# Patient Record
Sex: FEMALE | Race: WHITE | NOT HISPANIC OR LATINO | URBAN - METROPOLITAN AREA
[De-identification: names, ages, dates, MRNs, and addresses within clinical notes are randomized per-mention and may not be internally consistent; named-entity substitution may affect disease eponyms.]

---

## 2018-01-23 ENCOUNTER — LAB REQUISITION (OUTPATIENT)
Dept: LAB | Facility: HOSPITAL | Age: 43
End: 2018-01-23

## 2018-01-23 DIAGNOSIS — Z00.00 ROUTINE GENERAL MEDICAL EXAMINATION AT A HEALTH CARE FACILITY: ICD-10-CM

## 2018-01-23 PROCEDURE — 36415 COLL VENOUS BLD VENIPUNCTURE: CPT

## 2018-10-04 ENCOUNTER — LAB REQUISITION (OUTPATIENT)
Dept: LAB | Facility: HOSPITAL | Age: 43
End: 2018-10-04

## 2018-10-04 DIAGNOSIS — Z00.00 ROUTINE GENERAL MEDICAL EXAMINATION AT A HEALTH CARE FACILITY: ICD-10-CM

## 2018-10-04 PROCEDURE — 36415 COLL VENOUS BLD VENIPUNCTURE: CPT

## 2023-10-04 ENCOUNTER — APPOINTMENT (OUTPATIENT)
Dept: GENERAL RADIOLOGY | Facility: HOSPITAL | Age: 48
End: 2023-10-04
Payer: COMMERCIAL

## 2023-10-04 ENCOUNTER — HOSPITAL ENCOUNTER (OUTPATIENT)
Facility: HOSPITAL | Age: 48
Setting detail: OBSERVATION
LOS: 1 days | Discharge: HOME | End: 2023-10-06
Attending: EMERGENCY MEDICINE | Admitting: HOSPITALIST
Payer: COMMERCIAL

## 2023-10-04 ENCOUNTER — APPOINTMENT (OUTPATIENT)
Dept: CARDIOLOGY | Facility: HOSPITAL | Age: 48
End: 2023-10-04
Payer: COMMERCIAL

## 2023-10-04 ENCOUNTER — APPOINTMENT (OUTPATIENT)
Dept: CT IMAGING | Facility: HOSPITAL | Age: 48
End: 2023-10-04
Payer: COMMERCIAL

## 2023-10-04 DIAGNOSIS — I37.9: Primary | ICD-10-CM

## 2023-10-04 DIAGNOSIS — R06.02 SHORTNESS OF BREATH: ICD-10-CM

## 2023-10-04 PROBLEM — E87.6 HYPOKALEMIA: Status: ACTIVE | Noted: 2023-10-04

## 2023-10-04 PROBLEM — R93.89 ABNORMAL CT SCAN, CHEST: Status: ACTIVE | Noted: 2023-10-04

## 2023-10-04 PROBLEM — J40 BRONCHITIS: Status: ACTIVE | Noted: 2023-10-04

## 2023-10-04 LAB
ALBUMIN SERPL-MCNC: 3.9 G/DL (ref 3.5–5.2)
ALBUMIN/GLOB SERPL: 1.2 G/DL
ALP SERPL-CCNC: 132 U/L (ref 39–117)
ALT SERPL W P-5'-P-CCNC: 16 U/L (ref 1–33)
ANION GAP SERPL CALCULATED.3IONS-SCNC: 12 MMOL/L (ref 5–15)
AST SERPL-CCNC: 17 U/L (ref 1–32)
B PARAPERT DNA SPEC QL NAA+PROBE: NOT DETECTED
B PERT DNA SPEC QL NAA+PROBE: NOT DETECTED
BASOPHILS # BLD AUTO: 0.01 10*3/MM3 (ref 0–0.2)
BASOPHILS NFR BLD AUTO: 0.1 % (ref 0–1.5)
BILIRUB SERPL-MCNC: 0.3 MG/DL (ref 0–1.2)
BUN SERPL-MCNC: 10 MG/DL (ref 6–20)
BUN/CREAT SERPL: 15.4 (ref 7–25)
C PNEUM DNA NPH QL NAA+NON-PROBE: NOT DETECTED
CALCIUM SPEC-SCNC: 8.9 MG/DL (ref 8.6–10.5)
CHLORIDE SERPL-SCNC: 104 MMOL/L (ref 98–107)
CO2 SERPL-SCNC: 25 MMOL/L (ref 22–29)
CREAT SERPL-MCNC: 0.65 MG/DL (ref 0.57–1)
D DIMER PPP FEU-MCNC: 2.48 MCGFEU/ML (ref 0–0.5)
D-LACTATE SERPL-SCNC: 1 MMOL/L (ref 0.5–2)
DEPRECATED RDW RBC AUTO: 44.3 FL (ref 37–54)
EGFRCR SERPLBLD CKD-EPI 2021: 110.1 ML/MIN/1.73
EOSINOPHIL # BLD AUTO: 0.32 10*3/MM3 (ref 0–0.4)
EOSINOPHIL NFR BLD AUTO: 3.6 % (ref 0.3–6.2)
ERYTHROCYTE [DISTWIDTH] IN BLOOD BY AUTOMATED COUNT: 12.9 % (ref 12.3–15.4)
FLUAV SUBTYP SPEC NAA+PROBE: NOT DETECTED
FLUBV RNA ISLT QL NAA+PROBE: NOT DETECTED
GLOBULIN UR ELPH-MCNC: 3.3 GM/DL
GLUCOSE SERPL-MCNC: 109 MG/DL (ref 65–99)
HADV DNA SPEC NAA+PROBE: NOT DETECTED
HCOV 229E RNA SPEC QL NAA+PROBE: NOT DETECTED
HCOV HKU1 RNA SPEC QL NAA+PROBE: NOT DETECTED
HCOV NL63 RNA SPEC QL NAA+PROBE: NOT DETECTED
HCOV OC43 RNA SPEC QL NAA+PROBE: NOT DETECTED
HCT VFR BLD AUTO: 41.7 % (ref 34–46.6)
HGB BLD-MCNC: 13.4 G/DL (ref 12–15.9)
HMPV RNA NPH QL NAA+NON-PROBE: NOT DETECTED
HOLD SPECIMEN: NORMAL
HPIV1 RNA ISLT QL NAA+PROBE: NOT DETECTED
HPIV2 RNA SPEC QL NAA+PROBE: NOT DETECTED
HPIV3 RNA NPH QL NAA+PROBE: NOT DETECTED
HPIV4 P GENE NPH QL NAA+PROBE: NOT DETECTED
IMM GRANULOCYTES # BLD AUTO: 0.02 10*3/MM3 (ref 0–0.05)
IMM GRANULOCYTES NFR BLD AUTO: 0.2 % (ref 0–0.5)
LYMPHOCYTES # BLD AUTO: 1.02 10*3/MM3 (ref 0.7–3.1)
LYMPHOCYTES NFR BLD AUTO: 11.5 % (ref 19.6–45.3)
M PNEUMO IGG SER IA-ACNC: NOT DETECTED
MCH RBC QN AUTO: 30.2 PG (ref 26.6–33)
MCHC RBC AUTO-ENTMCNC: 32.1 G/DL (ref 31.5–35.7)
MCV RBC AUTO: 93.9 FL (ref 79–97)
MONOCYTES # BLD AUTO: 0.39 10*3/MM3 (ref 0.1–0.9)
MONOCYTES NFR BLD AUTO: 4.4 % (ref 5–12)
NEUTROPHILS NFR BLD AUTO: 7.11 10*3/MM3 (ref 1.7–7)
NEUTROPHILS NFR BLD AUTO: 80.2 % (ref 42.7–76)
NRBC BLD AUTO-RTO: 0 /100 WBC (ref 0–0.2)
NT-PROBNP SERPL-MCNC: 188.7 PG/ML (ref 0–450)
PLATELET # BLD AUTO: 204 10*3/MM3 (ref 140–450)
PMV BLD AUTO: 10.5 FL (ref 6–12)
POTASSIUM SERPL-SCNC: 3.2 MMOL/L (ref 3.5–5.2)
PROCALCITONIN SERPL-MCNC: 0.07 NG/ML (ref 0–0.25)
PROT SERPL-MCNC: 7.2 G/DL (ref 6–8.5)
QT INTERVAL: 344 MS
QTC INTERVAL: 436 MS
RBC # BLD AUTO: 4.44 10*6/MM3 (ref 3.77–5.28)
RHINOVIRUS RNA SPEC NAA+PROBE: NOT DETECTED
RSV RNA NPH QL NAA+NON-PROBE: NOT DETECTED
SARS-COV-2 RNA NPH QL NAA+NON-PROBE: NOT DETECTED
SODIUM SERPL-SCNC: 141 MMOL/L (ref 136–145)
TROPONIN T SERPL HS-MCNC: 7 NG/L
WBC NRBC COR # BLD: 8.87 10*3/MM3 (ref 3.4–10.8)
WHOLE BLOOD HOLD COAG: NORMAL
WHOLE BLOOD HOLD SPECIMEN: NORMAL

## 2023-10-04 PROCEDURE — 94799 UNLISTED PULMONARY SVC/PX: CPT

## 2023-10-04 PROCEDURE — G0378 HOSPITAL OBSERVATION PER HR: HCPCS

## 2023-10-04 PROCEDURE — 99285 EMERGENCY DEPT VISIT HI MDM: CPT

## 2023-10-04 PROCEDURE — 94640 AIRWAY INHALATION TREATMENT: CPT

## 2023-10-04 PROCEDURE — 71275 CT ANGIOGRAPHY CHEST: CPT

## 2023-10-04 PROCEDURE — 25810000003 LACTATED RINGERS SOLUTION: Performed by: EMERGENCY MEDICINE

## 2023-10-04 PROCEDURE — 84484 ASSAY OF TROPONIN QUANT: CPT | Performed by: EMERGENCY MEDICINE

## 2023-10-04 PROCEDURE — 83605 ASSAY OF LACTIC ACID: CPT | Performed by: STUDENT IN AN ORGANIZED HEALTH CARE EDUCATION/TRAINING PROGRAM

## 2023-10-04 PROCEDURE — 85025 COMPLETE CBC W/AUTO DIFF WBC: CPT | Performed by: EMERGENCY MEDICINE

## 2023-10-04 PROCEDURE — 25010000002 KETOROLAC TROMETHAMINE PER 15 MG: Performed by: EMERGENCY MEDICINE

## 2023-10-04 PROCEDURE — 83880 ASSAY OF NATRIURETIC PEPTIDE: CPT | Performed by: EMERGENCY MEDICINE

## 2023-10-04 PROCEDURE — 93306 TTE W/DOPPLER COMPLETE: CPT

## 2023-10-04 PROCEDURE — 96374 THER/PROPH/DIAG INJ IV PUSH: CPT

## 2023-10-04 PROCEDURE — 87040 BLOOD CULTURE FOR BACTERIA: CPT | Performed by: STUDENT IN AN ORGANIZED HEALTH CARE EDUCATION/TRAINING PROGRAM

## 2023-10-04 PROCEDURE — 25510000001 IOPAMIDOL PER 1 ML: Performed by: EMERGENCY MEDICINE

## 2023-10-04 PROCEDURE — 36415 COLL VENOUS BLD VENIPUNCTURE: CPT

## 2023-10-04 PROCEDURE — 84145 PROCALCITONIN (PCT): CPT | Performed by: INTERNAL MEDICINE

## 2023-10-04 PROCEDURE — 80053 COMPREHEN METABOLIC PANEL: CPT | Performed by: EMERGENCY MEDICINE

## 2023-10-04 PROCEDURE — 96372 THER/PROPH/DIAG INJ SC/IM: CPT

## 2023-10-04 PROCEDURE — 25010000002 ENOXAPARIN PER 10 MG: Performed by: INTERNAL MEDICINE

## 2023-10-04 PROCEDURE — 85379 FIBRIN DEGRADATION QUANT: CPT | Performed by: EMERGENCY MEDICINE

## 2023-10-04 PROCEDURE — 93306 TTE W/DOPPLER COMPLETE: CPT | Performed by: INTERNAL MEDICINE

## 2023-10-04 PROCEDURE — 0202U NFCT DS 22 TRGT SARS-COV-2: CPT | Performed by: INTERNAL MEDICINE

## 2023-10-04 PROCEDURE — 71045 X-RAY EXAM CHEST 1 VIEW: CPT

## 2023-10-04 PROCEDURE — 93005 ELECTROCARDIOGRAM TRACING: CPT | Performed by: EMERGENCY MEDICINE

## 2023-10-04 PROCEDURE — 94761 N-INVAS EAR/PLS OXIMETRY MLT: CPT

## 2023-10-04 RX ORDER — POTASSIUM CHLORIDE 750 MG/1
40 CAPSULE, EXTENDED RELEASE ORAL EVERY 4 HOURS
Status: COMPLETED | OUTPATIENT
Start: 2023-10-04 | End: 2023-10-04

## 2023-10-04 RX ORDER — HYDROXYZINE HYDROCHLORIDE 10 MG/1
10 TABLET, FILM COATED ORAL 4 TIMES DAILY PRN
Status: DISCONTINUED | OUTPATIENT
Start: 2023-10-04 | End: 2023-10-06 | Stop reason: HOSPADM

## 2023-10-04 RX ORDER — IPRATROPIUM BROMIDE AND ALBUTEROL SULFATE 2.5; .5 MG/3ML; MG/3ML
3 SOLUTION RESPIRATORY (INHALATION) ONCE
Status: COMPLETED | OUTPATIENT
Start: 2023-10-04 | End: 2023-10-04

## 2023-10-04 RX ORDER — IBUPROFEN 200 MG
200 TABLET ORAL EVERY 6 HOURS PRN
COMMUNITY
End: 2023-10-06 | Stop reason: HOSPADM

## 2023-10-04 RX ORDER — MINOCYCLINE HYDROCHLORIDE 50 MG/1
75 CAPSULE ORAL DAILY
COMMUNITY
End: 2023-10-06 | Stop reason: HOSPADM

## 2023-10-04 RX ORDER — SODIUM CHLORIDE 0.9 % (FLUSH) 0.9 %
10 SYRINGE (ML) INJECTION AS NEEDED
Status: DISCONTINUED | OUTPATIENT
Start: 2023-10-04 | End: 2023-10-06 | Stop reason: HOSPADM

## 2023-10-04 RX ORDER — BISACODYL 10 MG
10 SUPPOSITORY, RECTAL RECTAL DAILY PRN
Status: DISCONTINUED | OUTPATIENT
Start: 2023-10-04 | End: 2023-10-06 | Stop reason: HOSPADM

## 2023-10-04 RX ORDER — GUAIFENESIN AND DEXTROMETHORPHAN HYDROBROMIDE 600; 30 MG/1; MG/1
1 TABLET, EXTENDED RELEASE ORAL 2 TIMES DAILY PRN
Status: DISCONTINUED | OUTPATIENT
Start: 2023-10-04 | End: 2023-10-06 | Stop reason: HOSPADM

## 2023-10-04 RX ORDER — LORATADINE 10 MG/1
CAPSULE, LIQUID FILLED ORAL
COMMUNITY

## 2023-10-04 RX ORDER — ACETAMINOPHEN 325 MG/1
650 TABLET ORAL EVERY 4 HOURS PRN
Status: DISCONTINUED | OUTPATIENT
Start: 2023-10-04 | End: 2023-10-06 | Stop reason: HOSPADM

## 2023-10-04 RX ORDER — AMOXICILLIN 250 MG
2 CAPSULE ORAL 2 TIMES DAILY
Status: DISCONTINUED | OUTPATIENT
Start: 2023-10-04 | End: 2023-10-06 | Stop reason: HOSPADM

## 2023-10-04 RX ORDER — DOXYCYCLINE 100 MG/1
100 CAPSULE ORAL EVERY 12 HOURS SCHEDULED
Status: DISCONTINUED | OUTPATIENT
Start: 2023-10-04 | End: 2023-10-05

## 2023-10-04 RX ORDER — THIAMINE HCL 50 MG
50 TABLET ORAL DAILY
COMMUNITY

## 2023-10-04 RX ORDER — SODIUM CHLORIDE 9 MG/ML
40 INJECTION, SOLUTION INTRAVENOUS AS NEEDED
Status: DISCONTINUED | OUTPATIENT
Start: 2023-10-04 | End: 2023-10-06 | Stop reason: HOSPADM

## 2023-10-04 RX ORDER — ENOXAPARIN SODIUM 100 MG/ML
40 INJECTION SUBCUTANEOUS DAILY
Status: DISCONTINUED | OUTPATIENT
Start: 2023-10-04 | End: 2023-10-06

## 2023-10-04 RX ORDER — MULTIVIT WITH MINERALS/LUTEIN
1000 TABLET ORAL DAILY
COMMUNITY

## 2023-10-04 RX ORDER — SODIUM CHLORIDE 0.9 % (FLUSH) 0.9 %
10 SYRINGE (ML) INJECTION EVERY 12 HOURS SCHEDULED
Status: DISCONTINUED | OUTPATIENT
Start: 2023-10-04 | End: 2023-10-06 | Stop reason: HOSPADM

## 2023-10-04 RX ORDER — IPRATROPIUM BROMIDE AND ALBUTEROL SULFATE 2.5; .5 MG/3ML; MG/3ML
3 SOLUTION RESPIRATORY (INHALATION)
Status: DISCONTINUED | OUTPATIENT
Start: 2023-10-04 | End: 2023-10-06 | Stop reason: HOSPADM

## 2023-10-04 RX ORDER — MELATONIN
1000 DAILY
COMMUNITY

## 2023-10-04 RX ORDER — LOSARTAN POTASSIUM 50 MG/1
100 TABLET ORAL
Status: DISCONTINUED | OUTPATIENT
Start: 2023-10-04 | End: 2023-10-06 | Stop reason: HOSPADM

## 2023-10-04 RX ORDER — BISACODYL 5 MG/1
5 TABLET, DELAYED RELEASE ORAL DAILY PRN
Status: DISCONTINUED | OUTPATIENT
Start: 2023-10-04 | End: 2023-10-06 | Stop reason: HOSPADM

## 2023-10-04 RX ORDER — FLUTICASONE PROPIONATE 50 MCG
2 SPRAY, SUSPENSION (ML) NASAL DAILY
Status: DISCONTINUED | OUTPATIENT
Start: 2023-10-04 | End: 2023-10-06 | Stop reason: HOSPADM

## 2023-10-04 RX ORDER — POLYETHYLENE GLYCOL 3350 17 G/17G
17 POWDER, FOR SOLUTION ORAL DAILY PRN
Status: DISCONTINUED | OUTPATIENT
Start: 2023-10-04 | End: 2023-10-06 | Stop reason: HOSPADM

## 2023-10-04 RX ORDER — KETOROLAC TROMETHAMINE 15 MG/ML
15 INJECTION, SOLUTION INTRAMUSCULAR; INTRAVENOUS ONCE
Status: COMPLETED | OUTPATIENT
Start: 2023-10-04 | End: 2023-10-04

## 2023-10-04 RX ORDER — POTASSIUM CHLORIDE 750 MG/1
40 CAPSULE, EXTENDED RELEASE ORAL ONCE
Status: COMPLETED | OUTPATIENT
Start: 2023-10-04 | End: 2023-10-04

## 2023-10-04 RX ORDER — NICOTINE 21 MG/24HR
1 PATCH, TRANSDERMAL 24 HOURS TRANSDERMAL
Status: DISCONTINUED | OUTPATIENT
Start: 2023-10-04 | End: 2023-10-06 | Stop reason: HOSPADM

## 2023-10-04 RX ORDER — ONDANSETRON 4 MG/1
4 TABLET, FILM COATED ORAL EVERY 6 HOURS PRN
Status: DISCONTINUED | OUTPATIENT
Start: 2023-10-04 | End: 2023-10-06 | Stop reason: HOSPADM

## 2023-10-04 RX ORDER — DOXYCYCLINE HYCLATE 100 MG/1
100 CAPSULE ORAL 2 TIMES DAILY
COMMUNITY
End: 2023-10-06 | Stop reason: HOSPADM

## 2023-10-04 RX ORDER — ESCITALOPRAM OXALATE 20 MG/1
20 TABLET ORAL DAILY
Status: DISCONTINUED | OUTPATIENT
Start: 2023-10-04 | End: 2023-10-06 | Stop reason: HOSPADM

## 2023-10-04 RX ADMIN — ESCITALOPRAM OXALATE 20 MG: 20 TABLET ORAL at 11:48

## 2023-10-04 RX ADMIN — GUAIFENESIN AND DEXTROMETHORPHAN HYDROBROMIDE 1 TABLET: 600; 30 TABLET, EXTENDED RELEASE ORAL at 09:42

## 2023-10-04 RX ADMIN — IPRATROPIUM BROMIDE AND ALBUTEROL SULFATE 3 ML: 2.5; .5 SOLUTION RESPIRATORY (INHALATION) at 04:17

## 2023-10-04 RX ADMIN — DOXYCYCLINE 100 MG: 100 CAPSULE ORAL at 20:29

## 2023-10-04 RX ADMIN — KETOROLAC TROMETHAMINE 15 MG: 15 INJECTION, SOLUTION INTRAMUSCULAR; INTRAVENOUS at 04:03

## 2023-10-04 RX ADMIN — POTASSIUM CHLORIDE 40 MEQ: 750 CAPSULE, EXTENDED RELEASE ORAL at 20:29

## 2023-10-04 RX ADMIN — DOCUSATE SODIUM 50 MG AND SENNOSIDES 8.6 MG 2 TABLET: 8.6; 5 TABLET, FILM COATED ORAL at 20:29

## 2023-10-04 RX ADMIN — POTASSIUM CHLORIDE 40 MEQ: 750 CAPSULE, EXTENDED RELEASE ORAL at 17:46

## 2023-10-04 RX ADMIN — IPRATROPIUM BROMIDE AND ALBUTEROL SULFATE 3 ML: 2.5; .5 SOLUTION RESPIRATORY (INHALATION) at 19:46

## 2023-10-04 RX ADMIN — Medication 10 ML: at 20:25

## 2023-10-04 RX ADMIN — Medication 10 ML: at 11:47

## 2023-10-04 RX ADMIN — ENOXAPARIN SODIUM 40 MG: 100 INJECTION SUBCUTANEOUS at 11:49

## 2023-10-04 RX ADMIN — LOSARTAN POTASSIUM 100 MG: 50 TABLET, FILM COATED ORAL at 11:48

## 2023-10-04 RX ADMIN — ACETAMINOPHEN 650 MG: 325 TABLET ORAL at 11:47

## 2023-10-04 RX ADMIN — ACETAMINOPHEN 650 MG: 325 TABLET ORAL at 17:55

## 2023-10-04 RX ADMIN — POTASSIUM CHLORIDE 40 MEQ: 750 CAPSULE, EXTENDED RELEASE ORAL at 06:26

## 2023-10-04 RX ADMIN — DOXYCYCLINE 100 MG: 100 CAPSULE ORAL at 09:42

## 2023-10-04 RX ADMIN — SODIUM CHLORIDE, POTASSIUM CHLORIDE, SODIUM LACTATE AND CALCIUM CHLORIDE 1000 ML: 600; 310; 30; 20 INJECTION, SOLUTION INTRAVENOUS at 04:04

## 2023-10-04 RX ADMIN — IOPAMIDOL 80 ML: 755 INJECTION, SOLUTION INTRAVENOUS at 05:57

## 2023-10-04 RX ADMIN — IPRATROPIUM BROMIDE AND ALBUTEROL SULFATE 3 ML: 2.5; .5 SOLUTION RESPIRATORY (INHALATION) at 16:40

## 2023-10-04 RX ADMIN — IPRATROPIUM BROMIDE AND ALBUTEROL SULFATE 3 ML: 2.5; .5 SOLUTION RESPIRATORY (INHALATION) at 11:50

## 2023-10-04 NOTE — H&P
[facility]  HISTORY AND PHYSICAL    Patient Name: Lizette Gagnon  : 1975  MRN: [id number]  Primary Care Physician: April Sutherland MD  Date of admission: 10/4/2023      Subjective   Subjective     Chief Complaint:  Cough, congestion    HPI:  Lizette Gagnon is a 48 y.o. female w heavy smoking history, HTN who presented with 5 days of cough, sore throat, nasal stuffiness, subjective fever. Granddaughter sick with similar symptoms. She was seen at [facility] three days ago and reportedly tested negative for flu/COVID at that time and was discharged with doxycycline which has not convincingly helped symptoms. Felt more short of breath today, presented here. Denies h/o COPD or asthma but heavy smoking x 30+ years.    Reports being in good health until Friday - no preceding fevers or weight loss. No h/o IVDU, heart valve abnormality or murmur. Follows regularly with her physician.    Reports PCN allergy with recent reaction including dyspnea + dizziness within 5 years. Did not require hospitalization but required trx w benadryl    Personal History     Past Medical History:   Diagnosis Date    Hypertension        History reviewed. No pertinent surgical history.    Family History: family history is not on file.     Social History:  reports that she has been smoking cigarettes. She has a 30.00 pack-year smoking history. She has never used smokeless tobacco. She reports that she does not use drugs.  Social History     Social History Narrative    Not on file       Medications:  Available home medication information reviewed.  Medications Prior to Admission   Medication Sig Dispense Refill Last Dose    doxycycline (VIBRAMYCIN) 100 MG capsule Take 1 capsule by mouth 2 (Two) Times a Day.       escitalopram (LEXAPRO) 20 MG tablet escitalopram 20 mg tablet   Take 1 tablet by mouth once daily       losartan (COZAAR) 50 MG tablet 2 tablets.       minocycline  (MINOCIN,DYNACIN) 50 MG capsule Take 75 mg by mouth Daily.       Cholecalciferol 25 MCG (1000 UT) tablet Take 1 tablet by mouth Daily.       ibuprofen (ADVIL,MOTRIN) 200 MG tablet Take 1 tablet by mouth Every 6 (Six) Hours As Needed for Mild Pain.       Loratadine 10 MG capsule Take  by mouth.       Pseudoephedrine-APAP-DM (DAYQUIL MULTI-SYMPTOM COLD/FLU PO) Take  by mouth.       Thiamine HCl (vitamin B-1) 50 MG tablet Take 1 tablet by mouth Daily.       VITAMIN E 1000 UNIT capsule Take 1 capsule by mouth Daily.          Allergies   Allergen Reactions    Nuts Shortness Of Breath    Penicillins Shortness Of Breath and Dizziness     PEN-FAST = 3+, high risk for true allergy       Objective   Objective     Vital Signs:   Temp:  [98.6 °F (37 °C)-99.3 °F (37.4 °C)] 99.3 °F (37.4 °C)  Heart Rate:  [78-98] 95  Resp:  [18-26] 18  BP: (140-156)/() 140/92  Flow (L/min):  [2] 2       Physical Exam   Constitutional: Awake, alert female. Not toxic appearing  Eyes: PERRLA, sclerae anicteric, no conjunctival injection  HENT: NCAT, mucous membranes moist  Neck: Supple, no thyromegaly, no lymphadenopathy, trachea midline  Respiratory: Upper airway congestive sounds, but clear to auscultation bilaterally, mildly increased WOB on room air  Cardiovascular: RRR, no murmurs, rubs, or gallops, palpable pedal pulses bilaterally  Gastrointestinal: Positive bowel sounds, soft, nontender, nondistended  Musculoskeletal: No bilateral ankle edema, no clubbing or cyanosis to extremities  Psychiatric: Appropriate affect, cooperative  Neurologic: Oriented x 3, strength symmetric in all extremities, Cranial Nerves grossly intact to confrontation, speech clear  Skin: No rashes, toes/fingers examined without any lesions    Result Review:  I have personally reviewed the results from the time of this admission to 10/4/2023 09:20 EDT and agree with these findings:  [x]  Laboratory list / accordion  []  Microbiology  [x]  Radiology: CTA chest  personally reviewed and interpreted: tiny effusions, no significant pulm dz  []  EKG/Telemetry   []  Cardiology/Vascular   []  Pathology  []  Old records  []  Other:  Most notable findings include:   Concern for pulmonary valve lesion on CTA  Procal 0.07        LAB RESULTS:      Lab 10/04/23  0344   WBC 8.87   HEMOGLOBIN 13.4   HEMATOCRIT 41.7   PLATELETS 204   NEUTROS ABS 7.11*   IMMATURE GRANS (ABS) 0.02   LYMPHS ABS 1.02   MONOS ABS 0.39   EOS ABS 0.32   MCV 93.9   PROCALCITONIN 0.07   LACTATE 1.0   D DIMER QUANT 2.48*         Lab 10/04/23  0344   SODIUM 141   POTASSIUM 3.2*   CHLORIDE 104   CO2 25.0   ANION GAP 12.0   BUN 10   CREATININE 0.65   EGFR 110.1   GLUCOSE 109*   CALCIUM 8.9         Lab 10/04/23  0344   TOTAL PROTEIN 7.2   ALBUMIN 3.9   GLOBULIN 3.3   ALT (SGPT) 16   AST (SGOT) 17   BILIRUBIN 0.3   ALK PHOS 132*         Lab 10/04/23  0344   PROBNP 188.7   HSTROP T 7                     Microbiology Results (last 10 days)       Procedure Component Value - Date/Time    Respiratory Panel PCR w/COVID-19(SARS-CoV-2) [facility]INDER/BLAS/[facility] In-House, NP Swab in UTM/VTM, 3-4 HR TAT - Swab, Nasopharynx [[id number]]  (Normal) Collected: 10/04/23 0740    Lab Status: Final result Specimen: Swab from Nasopharynx Updated: 10/04/23 0920     ADENOVIRUS, PCR Not Detected     Coronavirus 229E Not Detected     Coronavirus HKU1 Not Detected     Coronavirus NL63 Not Detected     Coronavirus OC43 Not Detected     COVID19 Not Detected     Human Metapneumovirus Not Detected     Human Rhinovirus/Enterovirus Not Detected     Influenza A PCR Not Detected     Influenza B PCR Not Detected     Parainfluenza Virus 1 Not Detected     Parainfluenza Virus 2 Not Detected     Parainfluenza Virus 3 Not Detected     Parainfluenza Virus 4 Not Detected     RSV, PCR Not Detected     Bordetella pertussis pcr Not Detected     Bordetella parapertussis PCR Not Detected     Chlamydophila pneumoniae PCR Not Detected     Mycoplasma pneumo by PCR  Not Detected    Narrative:      In the setting of a positive respiratory panel with a viral infection PLUS a negative procalcitonin without other underlying concern for bacterial infection, consider observing off antibiotics or discontinuation of antibiotics and continue supportive care. If the respiratory panel is positive for atypical bacterial infection (Bordetella pertussis, Chlamydophila pneumoniae, or Mycoplasma pneumoniae), consider antibiotic de-escalation to target atypical bacterial infection.            XR Chest 1 View    Result Date: 10/4/2023  XR CHEST 1 VW Date of Exam: 10/4/2023 3:56 AM EDT Indication: SOA triage protocol Comparison: None available. Findings: There are no airspace consolidations. No pleural fluid. No pneumothorax. The pulmonary vasculature appears within normal limits. The cardiac and mediastinal silhouette appear unremarkable. No acute osseous abnormality identified.     Impression: Impression: No active disease Electronically Signed: Jimmy Ventura MD  10/4/2023 4:16 AM EDT  Workstation ID: [id number]    CT Angiogram Chest    Result Date: 10/4/2023  CT ANGIOGRAM CHEST Date of Exam: 10/4/2023 5:51 AM EDT Indication: Pulmonary embolism (PE) suspected, unknown D-dimer. Comparison: None available. Technique: CTA of the chest was performed after the uneventful intravenous administration of 80 mL Isovue-370. Reconstructed coronal and sagittal images were also obtained. In addition, a 3-D volume rendered image was created for interpretation. Automated exposure control and iterative reconstruction methods were used. Findings: There is excellent pulmonary arterial opacification. There is a 1 cm low-density area seen within the main pulmonary artery which appears to be arising from the pulmonic valve leaflets. This is concerning for possible vegetation on the pulmonary valve. Other soft tissue etiologies such as sarcoma would be in the differential although very rare and unlikely. Further  characterization with cardiac echocardiography may be useful for characterization. An isolated thrombus on the pulmonic valve would be also very rare. Consider reimaging if this is possibly artifactual. There is no convincing right or left main pulmonary arterial embolism. There is no convincing pathologic adenopathy although there are several prominent lymph nodes in the AP window which do not meet CT size criteria for pathologically enlarged. Mild bilateral hilar lymph node prominence is seen again not reaching CT size criteria for pathologically enlarged. There are small bilateral pleural effusions. There is mild bilateral basilar atelectasis. Limited imaging of the upper solid abdominal structures is within normal limits. The osseous structures are within normal limits. There is evidence of right breast implant rupture.     Impression: Impression: 1.Abnormal soft tissue appears to arise from the pulmonic valve measuring about 1 cm. This could represent a small vegetation or soft tissue mass. Isolated thrombus in this region would be unusual. 2.There is no obvious pulmonary embolism. 3.Small bilateral pleural effusions with basilar atelectasis. 4.Right breast implant rupture. Electronically Signed: Jimmy Ventura MD  10/4/2023 6:29 AM EDT  Workstation ID: [id number]         Assessment & Plan   Assessment & Plan     Active Hospital Problems    Diagnosis  POA    **Abnormal CT scan, chest [R93.89]  Yes    Bronchitis [J40]  Yes    Hypokalemia [E87.6]  Yes     Lizette L Chelsi is a 48 y.o. female w heavy smoking history, HTN who presented with 5 days of cough, sore throat, nasal stuffiness, subjective fever c/w viral UTI. Found to have concern for pulmonary valve lesion on CTA chest.    Possible pulmonic valve vegetation (per CTA chest)  -Rodriges criteria w 1 minor (fever - likely 2/2 viral UTI) and possible 1 major (on CTA, not yet seen by ECHO). More of an incidental finding clinically  -TTE to evaluate for lesion v  artifact  -blood cultures pending  -hold on abx for now given patient stability, low procal, no other s/s of endocarditis for definitive diagnosis. If clinical worsening or blood culture+ (wouldn't initiate for only fever given clinical picture of URI), start vancomycin. True PCN allergy w PEN-FAST 3+    Viral URI syndrome  -not requiring O2  -repeat COVID/flu/viral testing  -continue home doxycycline, mucinex, duonebs    Tobacco use  -motivated for cessation. NRT here and at discharge    HTN - home losartan  Anxiety - home lexapro+ vistaril  Hypokalemia - replacement for mild hypokalemia      DVT prophylaxis:  lovenox  CODE STATUS:  FULL CODE  Code Status and Medical Interventions:   Ordered at: 10/04/23 0819     Level Of Support Discussed With:    Patient     Code Status (Patient has no pulse and is not breathing):    CPR (Attempt to Resuscitate)     Medical Interventions (Patient has pulse or is breathing):    Full Support       Expected Discharge possible 10/5, more likely 10/6 (Discharge date is tentative pending patient's medical condition and is subject to change)  Expected Discharge Date: 10/6/2023; Expected Discharge Time:      Linda Arrieta MD  10/04/23

## 2023-10-04 NOTE — ED PROVIDER NOTES
Subjective   History of Present Illness  Patient presents for evaluation of difficulty breathing that has been progressively worsening over the past several days.  Other symptoms include headache, muscle aches, cough, nasal congestion and runny nose.    Patient was seen at an outside emergency room 3 days ago, diagnosed with a bacterial upper respiratory infection, placed on a course of doxycycline.  Patient states this is not helping and she is getting worse.    History provided by:  Patient    Review of Systems    Past Medical History:   Diagnosis Date    Hypertension        Allergies   Allergen Reactions    Penicillins Dizziness and Shortness Of Breath       History reviewed. No pertinent surgical history.    History reviewed. No pertinent family history.    Social History     Socioeconomic History    Marital status: Single   Tobacco Use    Smoking status: Every Day     Packs/day: 1.00     Years: 30.00     Pack years: 30.00     Types: Cigarettes    Smokeless tobacco: Never   Substance and Sexual Activity    Drug use: Never           Objective   Physical Exam  Constitutional:       General: She is not in acute distress.  HENT:      Head: Normocephalic and atraumatic.   Eyes:      Conjunctiva/sclera: Conjunctivae normal.      Pupils: Pupils are equal, round, and reactive to light.   Cardiovascular:      Rate and Rhythm: Normal rate and regular rhythm.      Pulses: Normal pulses.      Heart sounds: No murmur heard.    No gallop.   Pulmonary:      Effort: Pulmonary effort is normal. No respiratory distress.      Comments: Tachypneic without increased work of breathing.  No rales, rhonchi, wheezing  Abdominal:      General: Abdomen is flat. There is no distension.      Tenderness: There is no abdominal tenderness.   Musculoskeletal:         General: No swelling or deformity. Normal range of motion.   Skin:     General: Skin is warm and dry.      Capillary Refill: Capillary refill takes less than 2 seconds.    Neurological:      General: No focal deficit present.      Mental Status: She is alert and oriented to person, place, and time.   Psychiatric:         Mood and Affect: Mood normal.         Behavior: Behavior normal.       Procedures           ED Course  ED Course as of 10/04/23 0641   Wed Oct 04, 2023   0342 Twelve-lead ECG independently interpreted by myself demonstrates normal sinus rhythm, no ST segment elevation or depression. [KB]      ED Course User Index  [KB] Joaquin Ruelas MD                                           Medical Decision Making  Believe most likely diagnosis is a viral respiratory infection, she tested negative for COVID and flu on prior hospital visit.  Also consider pneumonia, pulmonary embolism, pulmonary edema, pleural effusion, pneumothorax.    Given abnormal findings of CTA I believe patient will require inpatient hospitalization for further diagnostic clarification.  She is hemodynamically stable, not septic appearing, she is not hypoxic.  Therapeutic interventions were deferred until further diagnostic clarification is obtained.    Problems Addressed:  Pulmonary valve lesion: complicated acute illness or injury  Shortness of breath: complicated acute illness or injury    Amount and/or Complexity of Data Reviewed  External Data Reviewed: labs, radiology and notes.     Details: Reviewed physician notes, labs, radiology reports from prior ER visit on 10/1/2023 at outside hospital demonstrating patient symptoms at that time, no evidence of pneumonia on chest x-ray, COVID and flu swabs negative, diagnosis of a bacterial URI and treatment plan with doxycycline.  Labs: ordered.     Details: Laboratory work-up independently interpreted by myself is notable for an elevated D-dimer, no other significant abnormalities  Radiology: ordered and independent interpretation performed.     Details: CT of the chest was independently interpreted by myself and demonstrates a proximal filling defect within  the pulmonary artery presumably attached to the pulmonary valve.  This could be vegetation, mass, or potentially an unusual location for pulmonary embolism.  ECG/medicine tests: ordered and independent interpretation performed. Decision-making details documented in ED Course.  Discussion of management or test interpretation with external provider(s): Consulted with radiologist given abnormal imaging findings.  Hospital medicine consulted for admission    Risk  Prescription drug management.  Decision regarding hospitalization.        Final diagnoses:   Pulmonary valve lesion   Shortness of breath       ED Disposition  ED Disposition       ED Disposition   Decision to Admit    Condition   --    Comment   --             Recent Results (from the past 24 hour(s))   ECG 12 Lead ED Triage Standing Order; SOA    Collection Time: 10/04/23  3:43 AM   Result Value Ref Range    QT Interval 344 ms    QTC Interval 436 ms   Comprehensive Metabolic Panel    Collection Time: 10/04/23  3:44 AM    Specimen: Blood   Result Value Ref Range    Glucose 109 (H) 65 - 99 mg/dL    BUN 10 6 - 20 mg/dL    Creatinine 0.65 0.57 - 1.00 mg/dL    Sodium 141 136 - 145 mmol/L    Potassium 3.2 (L) 3.5 - 5.2 mmol/L    Chloride 104 98 - 107 mmol/L    CO2 25.0 22.0 - 29.0 mmol/L    Calcium 8.9 8.6 - 10.5 mg/dL    Total Protein 7.2 6.0 - 8.5 g/dL    Albumin 3.9 3.5 - 5.2 g/dL    ALT (SGPT) 16 1 - 33 U/L    AST (SGOT) 17 1 - 32 U/L    Alkaline Phosphatase 132 (H) 39 - 117 U/L    Total Bilirubin 0.3 0.0 - 1.2 mg/dL    Globulin 3.3 gm/dL    A/G Ratio 1.2 g/dL    BUN/Creatinine Ratio 15.4 7.0 - 25.0    Anion Gap 12.0 5.0 - 15.0 mmol/L    eGFR 110.1 >60.0 mL/min/1.73   BNP    Collection Time: 10/04/23  3:44 AM    Specimen: Blood   Result Value Ref Range    proBNP 188.7 0.0 - 450.0 pg/mL   Single High Sensitivity Troponin T    Collection Time: 10/04/23  3:44 AM    Specimen: Blood   Result Value Ref Range    HS Troponin T 7 <10 ng/L   Green Top (Gel)     Collection Time: 10/04/23  3:44 AM   Result Value Ref Range    Extra Tube Hold for add-ons.    Lavender Top    Collection Time: 10/04/23  3:44 AM   Result Value Ref Range    Extra Tube hold for add-on    Gold Top - SST    Collection Time: 10/04/23  3:44 AM   Result Value Ref Range    Extra Tube Hold for add-ons.    Light Blue Top    Collection Time: 10/04/23  3:44 AM   Result Value Ref Range    Extra Tube Hold for add-ons.    CBC Auto Differential    Collection Time: 10/04/23  3:44 AM    Specimen: Blood   Result Value Ref Range    WBC 8.87 3.40 - 10.80 10*3/mm3    RBC 4.44 3.77 - 5.28 10*6/mm3    Hemoglobin 13.4 12.0 - 15.9 g/dL    Hematocrit 41.7 34.0 - 46.6 %    MCV 93.9 79.0 - 97.0 fL    MCH 30.2 26.6 - 33.0 pg    MCHC 32.1 31.5 - 35.7 g/dL    RDW 12.9 12.3 - 15.4 %    RDW-SD 44.3 37.0 - 54.0 fl    MPV 10.5 6.0 - 12.0 fL    Platelets 204 140 - 450 10*3/mm3    Neutrophil % 80.2 (H) 42.7 - 76.0 %    Lymphocyte % 11.5 (L) 19.6 - 45.3 %    Monocyte % 4.4 (L) 5.0 - 12.0 %    Eosinophil % 3.6 0.3 - 6.2 %    Basophil % 0.1 0.0 - 1.5 %    Immature Grans % 0.2 0.0 - 0.5 %    Neutrophils, Absolute 7.11 (H) 1.70 - 7.00 10*3/mm3    Lymphocytes, Absolute 1.02 0.70 - 3.10 10*3/mm3    Monocytes, Absolute 0.39 0.10 - 0.90 10*3/mm3    Eosinophils, Absolute 0.32 0.00 - 0.40 10*3/mm3    Basophils, Absolute 0.01 0.00 - 0.20 10*3/mm3    Immature Grans, Absolute 0.02 0.00 - 0.05 10*3/mm3    nRBC 0.0 0.0 - 0.2 /100 WBC   D-dimer, Quantitative    Collection Time: 10/04/23  3:44 AM    Specimen: Blood   Result Value Ref Range    D-Dimer, Quantitative 2.48 (H) 0.00 - 0.50 MCGFEU/mL     Note: In addition to lab results from this visit, the labs listed above may include labs taken at another facility or during a different encounter within the last 24 hours. Please correlate lab times with ED admission and discharge times for further clarification of the services performed during this visit.    CT Angiogram Chest   Final Result   Impression:  "  1.Abnormal soft tissue appears to arise from the pulmonic valve measuring about 1 cm. This could represent a small vegetation or soft tissue mass. Isolated thrombus in this region would be unusual.   2.There is no obvious pulmonary embolism.   3.Small bilateral pleural effusions with basilar atelectasis.   4.Right breast implant rupture.         Electronically Signed: Jimmy Ventura MD     10/4/2023 6:29 AM EDT     Workstation ID: [id number]      XR Chest 1 View   Final Result   Impression:   No active disease         Electronically Signed: Jimmy Ventura MD     10/4/2023 4:16 AM EDT     Workstation ID: [id number]        Vitals:    10/04/23 0336 10/04/23 0417   BP: (!) 156/101    BP Location: Right arm    Patient Position: Sitting    Pulse: 98 89   Resp: 26 24   Temp: 98.6 °F (37 °C)    TempSrc: Oral    SpO2: 96% 98%   Weight: 90.7 kg (200 lb)    Height: 167.6 cm (66\")      Medications   sodium chloride 0.9 % flush 10 mL (has no administration in time range)   lactated ringers bolus 1,000 mL (1,000 mL Intravenous New Bag 10/4/23 0404)   ketorolac (TORADOL) injection 15 mg (15 mg Intravenous Given 10/4/23 0403)   ipratropium-albuterol (DUO-NEB) nebulizer solution 3 mL (3 mL Nebulization Given 10/4/23 0417)   potassium chloride (MICRO-K/KLOR-CON) CR capsule (40 mEq Oral Given 10/4/23 0626)   iopamidol (ISOVUE-370) 76 % injection 100 mL (80 mL Intravenous Given 10/4/23 0557)     ECG/EMG Results (last 24 hours)       Procedure Component Value Units Date/Time    ECG 12 Lead ED Triage Standing Order; SOA [[id number]] Collected: 10/04/23 0343     Updated: 10/04/23 0516     QT Interval 344 ms      QTC Interval 436 ms     Narrative:      Test Reason : SOA  Blood Pressure :   */*   mmHG  Vent. Rate :  97 BPM     Atrial Rate :  97 BPM     P-R Int : 140 ms          QRS Dur :  74 ms      QT Int : 344 ms       P-R-T Axes :  70  70  55 degrees     QTc Int : 436 ms    ** Poor data quality, interpretation may be adversely affected  Normal " sinus rhythm  Possible Left atrial enlargement  Borderline ECG  No previous ECGs available  Confirmed by MD HENDRIX KYLE ([id number]) on 10/4/2023 5:15:56 AM    Referred By: LIANE           Confirmed By: TUTU HENDRIX MD          ECG 12 Lead ED Triage Standing Order; SOA   Final Result   Test Reason : SOA   Blood Pressure :   */*   mmHG   Vent. Rate :  97 BPM     Atrial Rate :  97 BPM      P-R Int : 140 ms          QRS Dur :  74 ms       QT Int : 344 ms       P-R-T Axes :  70  70  55 degrees      QTc Int : 436 ms      ** Poor data quality, interpretation may be adversely affected   Normal sinus rhythm   Possible Left atrial enlargement   Borderline ECG   No previous ECGs available   Confirmed by MD HENDRIX KYLE ([id number]) on 10/4/2023 5:15:56 AM      Referred By: LIANE           Confirmed By: TUTU HENDRIX MD            No follow-up provider specified.       Medication List      No changes were made to your prescriptions during this visit.            Tutu Hendrix MD  10/04/23 0641

## 2023-10-05 LAB
ANION GAP SERPL CALCULATED.3IONS-SCNC: 11 MMOL/L (ref 5–15)
BASOPHILS # BLD AUTO: 0.01 10*3/MM3 (ref 0–0.2)
BASOPHILS NFR BLD AUTO: 0.1 % (ref 0–1.5)
BH CV ECHO MEAS - AO MAX PG: 11 MMHG
BH CV ECHO MEAS - AO MEAN PG: 6 MMHG
BH CV ECHO MEAS - AO ROOT DIAM: 2.9 CM
BH CV ECHO MEAS - AO V2 MAX: 166 CM/SEC
BH CV ECHO MEAS - AO V2 VTI: 32.1 CM
BH CV ECHO MEAS - AVA(I,D): 2.7 CM2
BH CV ECHO MEAS - EDV(CUBED): 64 ML
BH CV ECHO MEAS - EDV(MOD-SP2): 58.9 ML
BH CV ECHO MEAS - EDV(MOD-SP4): 65.1 ML
BH CV ECHO MEAS - EF(MOD-BP): 59.5 %
BH CV ECHO MEAS - EF(MOD-SP2): 60.3 %
BH CV ECHO MEAS - EF(MOD-SP4): 59.4 %
BH CV ECHO MEAS - ESV(CUBED): 24.4 ML
BH CV ECHO MEAS - ESV(MOD-SP2): 23.4 ML
BH CV ECHO MEAS - ESV(MOD-SP4): 26.4 ML
BH CV ECHO MEAS - FS: 27.5 %
BH CV ECHO MEAS - IVS/LVPW: 1 CM
BH CV ECHO MEAS - IVSD: 0.8 CM
BH CV ECHO MEAS - LA DIMENSION: 3.3 CM
BH CV ECHO MEAS - LAT PEAK E' VEL: 13.6 CM/SEC
BH CV ECHO MEAS - LV MASS(C)D: 93.5 GRAMS
BH CV ECHO MEAS - LV MAX PG: 7.1 MMHG
BH CV ECHO MEAS - LV MEAN PG: 4 MMHG
BH CV ECHO MEAS - LV V1 MAX: 133 CM/SEC
BH CV ECHO MEAS - LV V1 VTI: 27.7 CM
BH CV ECHO MEAS - LVIDD: 4 CM
BH CV ECHO MEAS - LVIDS: 2.9 CM
BH CV ECHO MEAS - LVOT AREA: 3.1 CM2
BH CV ECHO MEAS - LVOT DIAM: 2 CM
BH CV ECHO MEAS - LVPWD: 0.8 CM
BH CV ECHO MEAS - MED PEAK E' VEL: 10.4 CM/SEC
BH CV ECHO MEAS - MV A MAX VEL: 60.1 CM/SEC
BH CV ECHO MEAS - MV DEC SLOPE: 456 CM/SEC2
BH CV ECHO MEAS - MV DEC TIME: 0.2 SEC
BH CV ECHO MEAS - MV E MAX VEL: 104 CM/SEC
BH CV ECHO MEAS - MV E/A: 1.73
BH CV ECHO MEAS - MV P1/2T: 75.2 MSEC
BH CV ECHO MEAS - MVA(P1/2T): 2.9 CM2
BH CV ECHO MEAS - PA ACC TIME: 0.09 SEC
BH CV ECHO MEAS - SV(LVOT): 87 ML
BH CV ECHO MEAS - SV(MOD-SP2): 35.5 ML
BH CV ECHO MEAS - SV(MOD-SP4): 38.7 ML
BH CV ECHO MEAS - TAPSE (>1.6): 1.85 CM
BH CV ECHO MEASUREMENTS AVERAGE E/E' RATIO: 8.67
BH CV VAS BP LEFT ARM: NORMAL MMHG
BH CV XLRA - RV BASE: 3.6 CM
BH CV XLRA - RV LENGTH: 7.8 CM
BH CV XLRA - RV MID: 3 CM
BH CV XLRA - TDI S': 17.2 CM/SEC
BUN SERPL-MCNC: 8 MG/DL (ref 6–20)
BUN/CREAT SERPL: 14.3 (ref 7–25)
CALCIUM SPEC-SCNC: 9.1 MG/DL (ref 8.6–10.5)
CHLORIDE SERPL-SCNC: 107 MMOL/L (ref 98–107)
CO2 SERPL-SCNC: 20 MMOL/L (ref 22–29)
CREAT SERPL-MCNC: 0.56 MG/DL (ref 0.57–1)
CRP SERPL-MCNC: 9.23 MG/DL (ref 0–0.5)
DEPRECATED RDW RBC AUTO: 44.5 FL (ref 37–54)
EGFRCR SERPLBLD CKD-EPI 2021: 114.1 ML/MIN/1.73
EOSINOPHIL # BLD AUTO: 0.66 10*3/MM3 (ref 0–0.4)
EOSINOPHIL NFR BLD AUTO: 7.9 % (ref 0.3–6.2)
ERYTHROCYTE [DISTWIDTH] IN BLOOD BY AUTOMATED COUNT: 13 % (ref 12.3–15.4)
GLUCOSE SERPL-MCNC: 129 MG/DL (ref 65–99)
HCT VFR BLD AUTO: 34.7 % (ref 34–46.6)
HGB BLD-MCNC: 11.4 G/DL (ref 12–15.9)
IMM GRANULOCYTES # BLD AUTO: 0.05 10*3/MM3 (ref 0–0.05)
IMM GRANULOCYTES NFR BLD AUTO: 0.6 % (ref 0–0.5)
IVRT: 77 MS
LEFT ATRIUM VOLUME INDEX: 26.2 ML/M2
LEFT ATRIUM VOLUME: 52.2 ML
LV EF 2D ECHO EST: 65 %
LYMPHOCYTES # BLD AUTO: 1.29 10*3/MM3 (ref 0.7–3.1)
LYMPHOCYTES NFR BLD AUTO: 15.4 % (ref 19.6–45.3)
MCH RBC QN AUTO: 30.6 PG (ref 26.6–33)
MCHC RBC AUTO-ENTMCNC: 32.9 G/DL (ref 31.5–35.7)
MCV RBC AUTO: 93.3 FL (ref 79–97)
MONOCYTES # BLD AUTO: 0.4 10*3/MM3 (ref 0.1–0.9)
MONOCYTES NFR BLD AUTO: 4.8 % (ref 5–12)
NEUTROPHILS NFR BLD AUTO: 5.99 10*3/MM3 (ref 1.7–7)
NEUTROPHILS NFR BLD AUTO: 71.2 % (ref 42.7–76)
NRBC BLD AUTO-RTO: 0 /100 WBC (ref 0–0.2)
PLATELET # BLD AUTO: 202 10*3/MM3 (ref 140–450)
PMV BLD AUTO: 10.4 FL (ref 6–12)
POTASSIUM SERPL-SCNC: 4 MMOL/L (ref 3.5–5.2)
RBC # BLD AUTO: 3.72 10*6/MM3 (ref 3.77–5.28)
SODIUM SERPL-SCNC: 138 MMOL/L (ref 136–145)
WBC NRBC COR # BLD: 8.4 10*3/MM3 (ref 3.4–10.8)

## 2023-10-05 PROCEDURE — 85025 COMPLETE CBC W/AUTO DIFF WBC: CPT | Performed by: INTERNAL MEDICINE

## 2023-10-05 PROCEDURE — 25010000002 ENOXAPARIN PER 10 MG: Performed by: INTERNAL MEDICINE

## 2023-10-05 PROCEDURE — G0378 HOSPITAL OBSERVATION PER HR: HCPCS

## 2023-10-05 PROCEDURE — 86431 RHEUMATOID FACTOR QUANT: CPT | Performed by: INTERNAL MEDICINE

## 2023-10-05 PROCEDURE — 96372 THER/PROPH/DIAG INJ SC/IM: CPT

## 2023-10-05 PROCEDURE — 80048 BASIC METABOLIC PNL TOTAL CA: CPT | Performed by: INTERNAL MEDICINE

## 2023-10-05 PROCEDURE — 94799 UNLISTED PULMONARY SVC/PX: CPT

## 2023-10-05 PROCEDURE — 94664 DEMO&/EVAL PT USE INHALER: CPT

## 2023-10-05 PROCEDURE — 86140 C-REACTIVE PROTEIN: CPT | Performed by: INTERNAL MEDICINE

## 2023-10-05 RX ADMIN — GUAIFENESIN AND DEXTROMETHORPHAN HYDROBROMIDE 1 TABLET: 600; 30 TABLET, EXTENDED RELEASE ORAL at 01:50

## 2023-10-05 RX ADMIN — IPRATROPIUM BROMIDE AND ALBUTEROL SULFATE 3 ML: 2.5; .5 SOLUTION RESPIRATORY (INHALATION) at 07:48

## 2023-10-05 RX ADMIN — ENOXAPARIN SODIUM 40 MG: 100 INJECTION SUBCUTANEOUS at 08:10

## 2023-10-05 RX ADMIN — DOXYCYCLINE 100 MG: 100 CAPSULE ORAL at 08:10

## 2023-10-05 RX ADMIN — ACETAMINOPHEN 650 MG: 325 TABLET ORAL at 12:42

## 2023-10-05 RX ADMIN — ACETAMINOPHEN 650 MG: 325 TABLET ORAL at 21:35

## 2023-10-05 RX ADMIN — HYDROXYZINE HYDROCHLORIDE 10 MG: 10 TABLET ORAL at 01:50

## 2023-10-05 RX ADMIN — LOSARTAN POTASSIUM 100 MG: 50 TABLET, FILM COATED ORAL at 08:10

## 2023-10-05 RX ADMIN — Medication 10 ML: at 21:36

## 2023-10-05 RX ADMIN — HYDROXYZINE HYDROCHLORIDE 10 MG: 10 TABLET ORAL at 22:12

## 2023-10-05 RX ADMIN — IPRATROPIUM BROMIDE AND ALBUTEROL SULFATE 3 ML: 2.5; .5 SOLUTION RESPIRATORY (INHALATION) at 11:41

## 2023-10-05 RX ADMIN — IPRATROPIUM BROMIDE AND ALBUTEROL SULFATE 3 ML: 2.5; .5 SOLUTION RESPIRATORY (INHALATION) at 15:57

## 2023-10-05 RX ADMIN — Medication 10 ML: at 08:10

## 2023-10-05 RX ADMIN — ESCITALOPRAM OXALATE 20 MG: 20 TABLET ORAL at 08:10

## 2023-10-05 RX ADMIN — GUAIFENESIN AND DEXTROMETHORPHAN HYDROBROMIDE 1 TABLET: 600; 30 TABLET, EXTENDED RELEASE ORAL at 16:54

## 2023-10-05 RX ADMIN — IPRATROPIUM BROMIDE AND ALBUTEROL SULFATE 3 ML: 2.5; .5 SOLUTION RESPIRATORY (INHALATION) at 19:49

## 2023-10-05 NOTE — NURSING NOTE
PT is A/O x 4 on RA, ambulatory, with good appetite, has had a BM today, and is pleasantly conversant. She has c/o pain in head. Her plan is for a REILLY tmrw. Will continue to monitor and proceed with current POC.

## 2023-10-05 NOTE — CASE MANAGEMENT/SOCIAL WORK
Discharge Planning Assessment  [facility]     Patient Name: Lizette Gagnon  MRN: [id number]  Today's Date: 10/5/2023    Admit Date: 10/4/2023    Plan: discharge plan   Discharge Needs Assessment       Row Name 10/05/23 1329       Living Environment    People in Home parent(s)    Name(s) of People in Home Pt's mother lives with her(Tawanna)    Primary Care Provided by self    Provides Primary Care For other (see comments)  Assists mother when needed    Family Caregiver if Needed child(cortes), adult    Family Caregiver Names David Gagnon(daughter)    Quality of Family Relationships unable to assess    Able to Return to Prior Arrangements yes    Living Arrangement Comments I spoke with pt in room with permission regarding discharge plan. Pt resides in [facility] and her mother, Tawanna, lives with her.       Transition Planning    Patient/Family Anticipates Transition to home with family    Patient/Family Anticipated Services at Transition        Discharge Needs Assessment    Readmission Within the Last 30 Days no previous admission in last 30 days    Equipment Currently Used at Home none    Concerns to be Addressed discharge planning    Equipment Needed After Discharge none    Discharge Coordination/Progress Pt confirms that she has [employer] with prescription coverage and uses [facility] in [place]. Pt is independent with ADLs and uss no DME for mobility. Pt reports her mother lives with her and she helps her mother when needed as her mother has a history of a stroke. Pt is here wit h c/os SOB and cough and diagnosis of abnormal CT. ID consulted. Goal is home at discharge and does not anticipate discharge needs. CM will cont to follow                   Discharge Plan       Row Name 10/05/23 5712       Plan    Plan discharge plan    Plan Comments Pt is here wit h c/os SOB and cough and diagnosis of abnormal CT. ID consulted. Goal is home at discharge and does not anticipate  discharge needs. CM will cont to follow    Final Discharge Disposition Code 01 - home or self-care                  Continued Care and Services - Admitted Since 10/4/2023    Coordination has not been started for this encounter.       Expected Discharge Date and Time       Expected Discharge Date Expected Discharge Time    Oct 6, 2023            Demographic Summary       Row Name 10/05/23 1318       General Information    General Information Comments PCP is FREDO ALONZO       Contact Information    Permission Granted to Share Info With     Contact Information Obtained for     Contact Information Comments Chaya Gagnon(daughter)  [phone]                   Functional Status       Row Name 10/05/23 1329       Functional Status    Functional Status Comments Pt is independent with ADLs                   Psychosocial    No documentation.                  Abuse/Neglect    No documentation.                  Legal    No documentation.                  Substance Abuse    No documentation.                  Patient Forms    No documentation.                     Loan Mata RN

## 2023-10-05 NOTE — CONSULTS
INFECTIOUS DISEASE CONSULT/INITIAL HOSPITAL VISIT    Lizette Gagnon  1975  [id number]    Date of consult: 10/5/2023    Admit date: 10/4/2023    Requesting Provider: No ref. provider found  Evaluating physician: Valarie Chow MD  Reason for Consultation:   Chief Complaint:       Subjective   History of present illness:  Patient is a  48 y.o.  Yr old female smoker with HTN in her usual state of health until ~ 1 week ago when she developed cough, fever, congestion and transient sore throat after her granddaughter had been ill with similar symptoms She was evaluated at [facility] 4 days ago where flu/covid test was reportedly negative. She was started on doxycycline but had ongoing subjective fever and progressive SOA w/o chest pain. She presented to [facility] ED where WBC 8.8, Procal normal, LA normal, BNP normal, D-Dimer 2.48, Resp panel negative, and CTA showed abnormal 1 cm soft tissue arising from the pulmonic valve c/w a small vegetation or soft tissue mass, small bilateral pleural effusions with basilar atelectasis and right breast implant rupture.    Tm 99.7 Blood cultures drawn on admission are negative to date CRP 9 On TTE the PV was not well visualized     Her CTA was reviewed with Dr Kashif Betts who concurred with the very unusual finding at the PV and also noted  thickened AV on 1 window of the CT       Past Medical History:   Diagnosis Date    Hypertension        History reviewed. No pertinent surgical history.    Pediatric History   Patient Parents    Not on file     Other Topics Concern    Not on file   Social History Narrative    Not on file   Smokes 1PPD  No IVDA  Works as a manager in a plumbing office    family history is not on file.    Allergies   Allergen Reactions    Nuts Shortness Of Breath    Penicillins Shortness Of Breath and Dizziness     PEN-FAST = 3+, high risk for true allergy       Immunization History   Administered Date(s) Administered    COVID-19 (PFIZER) Purple Cap Monovalent  07/26/2021, 08/16/2021       Medication:    Current Facility-Administered Medications:     acetaminophen (TYLENOL) tablet 650 mg, 650 mg, Oral, Q4H PRN, Linda Arrieta MD, 650 mg at 10/05/23 1242    sennosides-docusate (PERICOLACE) 8.6-50 MG per tablet 2 tablet, 2 tablet, Oral, BID, 2 tablet at 10/04/23 2029 **AND** polyethylene glycol (MIRALAX) packet 17 g, 17 g, Oral, Daily PRN **AND** bisacodyl (DULCOLAX) EC tablet 5 mg, 5 mg, Oral, Daily PRN **AND** bisacodyl (DULCOLAX) suppository 10 mg, 10 mg, Rectal, Daily PRN, Linda Arrieta MD    Calcium Replacement - Follow Nurse / BPA Driven Protocol, , Does not apply, PRN, Linda Arrieta MD    Enoxaparin Sodium (LOVENOX) syringe 40 mg, 40 mg, Subcutaneous, Daily, Linda Arrieta MD, 40 mg at 10/05/23 0810    escitalopram (LEXAPRO) tablet 20 mg, 20 mg, Oral, Daily, Linda Arrieta MD, 20 mg at 10/05/23 0810    fluticasone (FLONASE) 50 MCG/ACT nasal spray 2 spray, 2 spray, Each Nare, Daily, Linda Arrieta MD    guaifenesin-dextromethorphan (MUCINEX DM) tablet 1 tablet, 1 tablet, Oral, BID PRN, Linda Arrieta MD, 1 tablet at 10/05/23 1654    hydrOXYzine (ATARAX) tablet 10 mg, 10 mg, Oral, 4x Daily PRN, Linda Arrieta MD, 10 mg at 10/05/23 0150    ipratropium-albuterol (DUO-NEB) nebulizer solution 3 mL, 3 mL, Nebulization, 4x Daily - RT, Linda Arrieta MD, 3 mL at 10/05/23 1557    losartan (COZAAR) tablet 100 mg, 100 mg, Oral, Q24H, Linda Arrieta MD, 100 mg at 10/05/23 0810    Magnesium Standard Dose Replacement - Follow Nurse / BPA Driven Protocol, , Does not apply, PRBerny GERARD Emma L, MD    nicotine (NICODERM CQ) 14 MG/24HR patch 1 patch, 1 patch, Transdermal, Q24H, Linda Arrieta MD    ondansetron (ZOFRAN) tablet 4 mg, 4 mg, Oral, Q6H PRN, Linda Arrieta MD    Phosphorus Replacement - Follow Nurse / BPA Driven Protocol, , Does not apply, Berny LOPEZ Emma L, MD    Potassium Replacement - Follow Nurse / BPA Driven Protocol, , Does not apply, Berny LOPEZ Emma L, MD    sodium  "chloride 0.9 % flush 10 mL, 10 mL, Intravenous, PRN, Linda Arrieta MD    sodium chloride 0.9 % flush 10 mL, 10 mL, Intravenous, Q12H, Linda Arrieta MD, 10 mL at 10/05/23 0810    sodium chloride 0.9 % flush 10 mL, 10 mL, Intravenous, PRN, Linda Arrieta MD    sodium chloride 0.9 % infusion 40 mL, 40 mL, Intravenous, PRN, Linda Arrieta MD    Please refer to the medical record for a full medication list    Review of Systems:    Constitutional-- Subjective Fever.  HEENT-- No new vision, hearing or throat complaints- no sore throat at present  No epistaxis or oral sores.  Denies odynophagia or dysphagia.  No headache, photophobia or neck stiffness.  CV-- No chest pain, palpitation or syncope  Resp-- + SOA, + nonproductive cough, no hemoptysis  GI- No nausea, vomiting, or diarrhea. Denies jaundice or chronic liver disease.  -- No dysuria, hematuria, or flank pain.  Denies hesitancy, urgency or flank pain.  Heme- No active bruising or bleeding; no Hx of DVT or PE.  MS-- no swelling or pain in the bones or joints of arms/legs.  No new back pain.  Neuro-- No acute focal weakness or numbness in the arms or legs.  No seizures.  Skin--No rashes or lesions    Physical Exam:   Vital Signs   Temp:  [98.2 °F (36.8 °C)-99.7 °F (37.6 °C)] 98.2 °F (36.8 °C)  Heart Rate:  [] 81  Resp:  [18-20] 18  BP: (139-155)/(78-97) 145/91    Temp  Min: 98.2 °F (36.8 °C)  Max: 99.7 °F (37.6 °C)  BP  Min: 139/78  Max: 155/92  Pulse  Min: 77  Max: 106  Resp  Min: 18  Max: 20  SpO2  Min: 94 %  Max: 96 %    Blood pressure 145/91, pulse 81, temperature 98.2 °F (36.8 °C), temperature source Oral, resp. rate 18, height 167.6 cm (65.98\"), weight 92.9 kg (204 lb 12.9 oz), last menstrual period 09/29/2023, SpO2 94 %.  GENERAL: Awake and alert, in no acute distress but becomes mildly tachypneic with extended speaking Appears stated age.    HEENT:  Normocephalic, atraumatic.  Oropharynx without thrush. Dentition in good repair.  Ears externally " normal, Nose externally normal.  EYES: PERRL. No conjunctival injection. No icterus. EOM full.  HEART: RRR, no murmur  LUNGS: Clear to auscultation and percussion. No respiratory distress, no use of accessory muscles.  ABDOMEN: Soft, nontender, nondistended. Bowel sounds normal.  GENITAL: no Diego catheter  SKIN: no generalized rashes.  No peripheral stigmata of infective endocarditis noted.  PSYCHIATRIC: cooperative.  Appropriate mood and affect  EXT:  No cellulitic change.  NEURO: awake alert and oriented ×4.  Normal speech and cognition    Results Review:       Results from last 7 days   Lab Units 10/05/23  0419 10/04/23  0344   WBC 10*3/mm3 8.40 8.87   HEMOGLOBIN g/dL 11.4* 13.4   HEMATOCRIT % 34.7 41.7   PLATELETS 10*3/mm3 202 204     Results from last 7 days   Lab Units 10/05/23  0419   SODIUM mmol/L 138   POTASSIUM mmol/L 4.0   CHLORIDE mmol/L 107   CO2 mmol/L 20.0*   BUN mg/dL 8   CREATININE mg/dL 0.56*   GLUCOSE mg/dL 129*   CALCIUM mg/dL 9.1     Results from last 7 days   Lab Units 10/04/23  0344   ALK PHOS U/L 132*   BILIRUBIN mg/dL 0.3   ALT (SGPT) U/L 16   AST (SGOT) U/L 17         Results from last 7 days   Lab Units 10/05/23  0419   CRP mg/dL 9.23*         Results from last 7 days   Lab Units 10/04/23  0344   LACTATE mmol/L 1.0     Estimated Creatinine Clearance: 144.1 mL/min (A) (by C-G formula based on SCr of 0.56 mg/dL (L)).     Procalitonin Results:      Lab 10/04/23  0344   PROCALCITONIN 0.07      Brief Urine Lab Results       None           No results found for: SITE, ALLENTEST, PHART, GYM2DWI, PO2ART, UDF1WIG, BASEEXCESS, H9XUSOVH, HGBBG, HCTABG, OXYHEMOGLOBI, METHHGBN, CARBOXYHGB, CO2CT, BAROMETRIC, MODALITY, FIO2     Microbiology:  Blood Culture   Date Value Ref Range Status   10/04/2023 No growth at 24 hours  Preliminary     No results found for: BCIDPCR, CXREFLEX, CSFCX, CULTURETIS  No results found for: CULTURES, HSVCX, URCX  No results found for: EYECULTURE, GCCX, HSVCULTURE, LABHSV  No  results found for: LEGIONELLA, MRSACX, MUMPSCX, MYCOPLASCX  No results found for: NOCARDIACX, STOOLCX  No results found for: THROATCX, UNSTIMCULT, URINECX, CULTURE, VZVCULTUR  No results found for: VIRALCULTU, WOUNDCX     Blood Culture   Date Value Ref Range Status   10/04/2023 No growth at 24 hours  Preliminary   (      Radiology:  Imaging Results (Last 72 Hours)       Procedure Component Value Units Date/Time    CT Angiogram Chest [[id number]] Collected: 10/04/23 0610     Updated: 10/04/23 0632    Narrative:      CT ANGIOGRAM CHEST    Date of Exam: 10/4/2023 5:51 AM EDT    Indication: Pulmonary embolism (PE) suspected, unknown D-dimer.    Comparison: None available.    Technique: CTA of the chest was performed after the uneventful intravenous administration of 80 mL Isovue-370. Reconstructed coronal and sagittal images were also obtained. In addition, a 3-D volume rendered image was created for interpretation.   Automated exposure control and iterative reconstruction methods were used.    Findings:  There is excellent pulmonary arterial opacification.     There is a 1 cm low-density area seen within the main pulmonary artery which appears to be arising from the pulmonic valve leaflets. This is concerning for possible vegetation on the pulmonary valve. Other soft tissue etiologies such as sarcoma would be   in the differential although very rare and unlikely. Further characterization with cardiac echocardiography may be useful for characterization. An isolated thrombus on the pulmonic valve would be also very rare. Consider reimaging if this is possibly   artifactual.    There is no convincing right or left main pulmonary arterial embolism. There is no convincing pathologic adenopathy although there are several prominent lymph nodes in the AP window which do not meet CT size criteria for pathologically enlarged. Mild   bilateral hilar lymph node prominence is seen again not reaching CT size criteria for  pathologically enlarged.    There are small bilateral pleural effusions. There is mild bilateral basilar atelectasis. Limited imaging of the upper solid abdominal structures is within normal limits. The osseous structures are within normal limits. There is evidence of right breast   implant rupture.      Impression:      Impression:  1.Abnormal soft tissue appears to arise from the pulmonic valve measuring about 1 cm. This could represent a small vegetation or soft tissue mass. Isolated thrombus in this region would be unusual.  2.There is no obvious pulmonary embolism.  3.Small bilateral pleural effusions with basilar atelectasis.  4.Right breast implant rupture.      Electronically Signed: Jimmy Ventura MD    10/4/2023 6:29 AM EDT    Workstation ID: [id number]    XR Chest 1 View [[id number]] Collected: 10/04/23 0416     Updated: 10/04/23 0419    Narrative:      XR CHEST 1 VW    Date of Exam: 10/4/2023 3:56 AM EDT    Indication: SOA triage protocol    Comparison: None available.    Findings:  There are no airspace consolidations. No pleural fluid. No pneumothorax. The pulmonary vasculature appears within normal limits. The cardiac and mediastinal silhouette appear unremarkable. No acute osseous abnormality identified.      Impression:      Impression:  No active disease      Electronically Signed: Jimmy Ventura MD    10/4/2023 4:16 AM EDT    Workstation ID: [id number]            IMPRESSION:     -- Acute febrile illness with URTI and LRTI symptoms  Resp panel negative No improvement on doxycycline CT findings included bibasilar atelectasis and small pleural effusions     -- PV valve abnormality seen on CTA but not on TTE and possible AV abnormality    Blood cultures negative but are antibiotic modified   PV bacterial endocarditis is quite rare but evidently a significant proportion of patients with this infection have no identifiable risk factors  I would also be concerned about the possibility of marantic endocarditis  based on her elevated d-dimer and CRP     -- HTN    -- Tobacco abuse    RECOMMENDATIONS:    -- stop doxycycline and observe off antibiotics    -- proceed with REILLY    -- CHELA, Rheumatoid factor, Antiphospholipid profile etc             Thank you for asking me to see Lizette Gagnon.  Our group would be pleased to follow this patient over the course of their hospitalization and assist with outpatient antimicrobial therapy, as indicated.  Further recommendations depend on the results of the cultures and clinical course.    Valarie Chow MD  10/5/2023

## 2023-10-05 NOTE — PROGRESS NOTES
[facility] Medicine Services  PROGRESS NOTE    Patient Name: Lizette Gagnon  : 1975  MRN: [id number]    Date of Admission: 10/4/2023  Primary Care Physician: April Sutherland MD    Subjective   Subjective     CC: SOB.     HPI:  No acute events over the night.  Afebrile.  White blood count within normal patient denies any chest pain or skin lesions.  Patient is feeling overall better this morning.  Hemodynamically stable.  On room air.  TTE pending.  ID consulted.        Objective   Objective     Vital Signs:   Temp:  [98.2 °F (36.8 °C)-99.7 °F (37.6 °C)] 98.2 °F (36.8 °C)  Heart Rate:  [] 77  Resp:  [16-20] 18  BP: (139-155)/(77-94) 155/92  Flow (L/min):  [1-2] 2     Physical Exam:  Constitutional: Awake, alert female. Not toxic appearing  Eyes: PERRLA, sclerae anicteric, no conjunctival injection  HENT: NCAT, mucous membranes moist  Neck: Supple, no thyromegaly, no lymphadenopathy, trachea midline  Respiratory: Upper airway congestive sounds, but clear to auscultation bilaterally, mildly increased WOB on room air  Cardiovascular: RRR, no murmurs, rubs, or gallops, palpable pedal pulses bilaterally  Gastrointestinal: Positive bowel sounds, soft, nontender, nondistended  Musculoskeletal: No bilateral ankle edema, no clubbing or cyanosis to extremities  Psychiatric: Appropriate affect, cooperative  Neurologic: Oriented x 3, strength symmetric in all extremities, Cranial Nerves grossly intact to confrontation, speech clear  Skin: No rashes, toes/fingers examined without any lesions  Results Reviewed:  LAB RESULTS:      Lab 10/05/23  0419 10/04/23  0344   WBC 8.40 8.87   HEMOGLOBIN 11.4* 13.4   HEMATOCRIT 34.7 41.7   PLATELETS 202 204   NEUTROS ABS 5.99 7.11*   IMMATURE GRANS (ABS) 0.05 0.02   LYMPHS ABS 1.29 1.02   MONOS ABS 0.40 0.39   EOS ABS 0.66* 0.32   MCV 93.3 93.9   PROCALCITONIN  --  0.07   LACTATE  --  1.0   D DIMER QUANT  --  2.48*         Lab 10/05/23  0419  10/04/23  0344   SODIUM 138 141   POTASSIUM 4.0 3.2*   CHLORIDE 107 104   CO2 20.0* 25.0   ANION GAP 11.0 12.0   BUN 8 10   CREATININE 0.56* 0.65   EGFR 114.1 110.1   GLUCOSE 129* 109*   CALCIUM 9.1 8.9         Lab 10/04/23  0344   TOTAL PROTEIN 7.2   ALBUMIN 3.9   GLOBULIN 3.3   ALT (SGPT) 16   AST (SGOT) 17   BILIRUBIN 0.3   ALK PHOS 132*         Lab 10/04/23  0344   PROBNP 188.7   HSTROP T 7                 Brief Urine Lab Results       None            Microbiology Results Abnormal       Procedure Component Value - Date/Time    Blood Culture - Blood, Arm, Right [[id number]]  (Normal) Collected: 10/04/23 0713    Lab Status: Preliminary result Specimen: Blood from Arm, Right Updated: 10/05/23 0745     Blood Culture No growth at 24 hours    Respiratory Panel PCR w/COVID-19(SARS-CoV-2) [facility]/NARINDER/BLAS/PAD/COR/MAD/[facility]RIS In-House, NP Swab in UTM/VTM, 3-4 HR TAT - Swab, Nasopharynx [[id number]]  (Normal) Collected: 10/04/23 0740    Lab Status: Final result Specimen: Swab from Nasopharynx Updated: 10/04/23 0920     ADENOVIRUS, PCR Not Detected     Coronavirus 229E Not Detected     Coronavirus HKU1 Not Detected     Coronavirus NL63 Not Detected     Coronavirus OC43 Not Detected     COVID19 Not Detected     Human Metapneumovirus Not Detected     Human Rhinovirus/Enterovirus Not Detected     Influenza A PCR Not Detected     Influenza B PCR Not Detected     Parainfluenza Virus 1 Not Detected     Parainfluenza Virus 2 Not Detected     Parainfluenza Virus 3 Not Detected     Parainfluenza Virus 4 Not Detected     RSV, PCR Not Detected     Bordetella pertussis pcr Not Detected     Bordetella parapertussis PCR Not Detected     Chlamydophila pneumoniae PCR Not Detected     Mycoplasma pneumo by PCR Not Detected    Narrative:      In the setting of a positive respiratory panel with a viral infection PLUS a negative procalcitonin without other underlying concern for bacterial infection, consider observing off antibiotics or discontinuation  of antibiotics and continue supportive care. If the respiratory panel is positive for atypical bacterial infection (Bordetella pertussis, Chlamydophila pneumoniae, or Mycoplasma pneumoniae), consider antibiotic de-escalation to target atypical bacterial infection.            Adult Transthoracic Echo Complete W/ Cont if Necessary Per Protocol    Result Date: 10/5/2023    Left ventricular systolic function is normal. Estimated left ventricular EF = 65%   Trace tricuspid regurgitation with normal RVSP.     XR Chest 1 View    Result Date: 10/4/2023  XR CHEST 1 VW Date of Exam: 10/4/2023 3:56 AM EDT Indication: SOA triage protocol Comparison: None available. Findings: There are no airspace consolidations. No pleural fluid. No pneumothorax. The pulmonary vasculature appears within normal limits. The cardiac and mediastinal silhouette appear unremarkable. No acute osseous abnormality identified.     Impression: Impression: No active disease Electronically Signed: Jimmy Ventura MD  10/4/2023 4:16 AM EDT  Workstation ID: [id number]    CT Angiogram Chest    Result Date: 10/4/2023  CT ANGIOGRAM CHEST Date of Exam: 10/4/2023 5:51 AM EDT Indication: Pulmonary embolism (PE) suspected, unknown D-dimer. Comparison: None available. Technique: CTA of the chest was performed after the uneventful intravenous administration of 80 mL Isovue-370. Reconstructed coronal and sagittal images were also obtained. In addition, a 3-D volume rendered image was created for interpretation. Automated exposure control and iterative reconstruction methods were used. Findings: There is excellent pulmonary arterial opacification. There is a 1 cm low-density area seen within the main pulmonary artery which appears to be arising from the pulmonic valve leaflets. This is concerning for possible vegetation on the pulmonary valve. Other soft tissue etiologies such as sarcoma would be in the differential although very rare and unlikely. Further characterization  with cardiac echocardiography may be useful for characterization. An isolated thrombus on the pulmonic valve would be also very rare. Consider reimaging if this is possibly artifactual. There is no convincing right or left main pulmonary arterial embolism. There is no convincing pathologic adenopathy although there are several prominent lymph nodes in the AP window which do not meet CT size criteria for pathologically enlarged. Mild bilateral hilar lymph node prominence is seen again not reaching CT size criteria for pathologically enlarged. There are small bilateral pleural effusions. There is mild bilateral basilar atelectasis. Limited imaging of the upper solid abdominal structures is within normal limits. The osseous structures are within normal limits. There is evidence of right breast implant rupture.     Impression: Impression: 1.Abnormal soft tissue appears to arise from the pulmonic valve measuring about 1 cm. This could represent a small vegetation or soft tissue mass. Isolated thrombus in this region would be unusual. 2.There is no obvious pulmonary embolism. 3.Small bilateral pleural effusions with basilar atelectasis. 4.Right breast implant rupture. Electronically Signed: Jimmy Ventura MD  10/4/2023 6:29 AM EDT  Workstation ID: [id number]     Results for orders placed during the hospital encounter of 10/04/23    Adult Transthoracic Echo Complete W/ Cont if Necessary Per Protocol    Interpretation Summary    Left ventricular systolic function is normal. Estimated left ventricular EF = 65%    Trace tricuspid regurgitation with normal RVSP.      Current medications:  Scheduled Meds:doxycycline, 100 mg, Oral, Q12H  enoxaparin, 40 mg, Subcutaneous, Daily  escitalopram, 20 mg, Oral, Daily  fluticasone, 2 spray, Each Nare, Daily  ipratropium-albuterol, 3 mL, Nebulization, 4x Daily - RT  losartan, 100 mg, Oral, Q24H  nicotine, 1 patch, Transdermal, Q24H  senna-docusate sodium, 2 tablet, Oral, BID  sodium chloride,  10 mL, Intravenous, Q12H      Continuous Infusions:   PRN Meds:.  acetaminophen    senna-docusate sodium **AND** polyethylene glycol **AND** bisacodyl **AND** bisacodyl    Calcium Replacement - Follow Nurse / BPA Driven Protocol    guaifenesin-dextromethorphan    hydrOXYzine    Magnesium Standard Dose Replacement - Follow Nurse / BPA Driven Protocol    ondansetron    Phosphorus Replacement - Follow Nurse / BPA Driven Protocol    Potassium Replacement - Follow Nurse / BPA Driven Protocol    sodium chloride    sodium chloride    sodium chloride    Assessment & Plan   Assessment & Plan     Active Hospital Problems    Diagnosis  POA    **Abnormal CT scan, chest [R93.89]  Yes    Bronchitis [J40]  Yes    Hypokalemia [E87.6]  Yes      Resolved Hospital Problems   No resolved problems to display.        Brief Hospital Course to date:  Lizette Gagnon is a 48 y.o. female w heavy smoking history, HTN who presented with 5 days of cough, sore throat, nasal stuffiness, subjective fever c/w viral UTI. Found to have concern for pulmonary valve lesion on CTA chest.     Possible pulmonic valve vegetation (per CTA chest)  -Rodriges criteria w 1 minor (fever - likely 2/2 viral UTI) and possible 1 major (on CTA, not yet seen by ECHO). More of an incidental finding clinically  -TTE to evaluate for lesion v artifact  -blood cultures pending  -hold on abx for now given patient stability, low procal, no other s/s of endocarditis for definitive diagnosis. If clinical worsening or blood culture+ (wouldn't initiate for only fever given clinical picture of URI), start vancomycin. True PCN allergy w PEN-FAST 3+  No acute events over the night.  Afebrile.  White blood count within normal patient denies any chest pain or skin lesions.  Patient is feeling overall better this morning.  Hemodynamically stable.  On room air.  TTE pending.  ID consulted.    Viral URI syndrome  -not requiring O2  -repeat COVID/flu/viral testing  -continue home  doxycycline, mucinex, duonebs     Tobacco use  -motivated for cessation. NRT here and at discharge     HTN - home losartan  Anxiety - home lexapro+ vistaril  Hypokalemia - replacement for mild hypokalemia            Expected Discharge Location and Transportation:   Expected Discharge   Expected Discharge Date: 10/6/2023; Expected Discharge Time:      DVT prophylaxis:  Medical DVT prophylaxis orders are present.          CODE STATUS:   Code Status and Medical Interventions:   Ordered at: 10/04/23 0819     Level Of Support Discussed With:    Patient     Code Status (Patient has no pulse and is not breathing):    CPR (Attempt to Resuscitate)     Medical Interventions (Patient has pulse or is breathing):    Full Support       Wilbert Teran MD  10/05/23

## 2023-10-06 ENCOUNTER — APPOINTMENT (OUTPATIENT)
Dept: CARDIOLOGY | Facility: HOSPITAL | Age: 48
End: 2023-10-06
Payer: COMMERCIAL

## 2023-10-06 ENCOUNTER — TELEPHONE (OUTPATIENT)
Dept: CARDIOLOGY | Facility: CLINIC | Age: 48
End: 2023-10-06
Payer: COMMERCIAL

## 2023-10-06 VITALS
BODY MASS INDEX: 32.81 KG/M2 | TEMPERATURE: 98.8 F | HEIGHT: 66 IN | WEIGHT: 204.15 LBS | HEART RATE: 101 BPM | DIASTOLIC BLOOD PRESSURE: 92 MMHG | RESPIRATION RATE: 16 BRPM | SYSTOLIC BLOOD PRESSURE: 136 MMHG | OXYGEN SATURATION: 93 %

## 2023-10-06 LAB
ALBUMIN SERPL-MCNC: 3.5 G/DL (ref 3.5–5.2)
ALBUMIN/GLOB SERPL: 1.1 G/DL
ALP SERPL-CCNC: 161 U/L (ref 39–117)
ALT SERPL W P-5'-P-CCNC: 16 U/L (ref 1–33)
ANION GAP SERPL CALCULATED.3IONS-SCNC: 10 MMOL/L (ref 5–15)
AST SERPL-CCNC: 13 U/L (ref 1–32)
BASOPHILS # BLD AUTO: 0.01 10*3/MM3 (ref 0–0.2)
BASOPHILS NFR BLD AUTO: 0.1 % (ref 0–1.5)
BILIRUB SERPL-MCNC: 0.2 MG/DL (ref 0–1.2)
BUN SERPL-MCNC: 9 MG/DL (ref 6–20)
BUN/CREAT SERPL: 14.5 (ref 7–25)
CALCIUM SPEC-SCNC: 8.9 MG/DL (ref 8.6–10.5)
CHLORIDE SERPL-SCNC: 105 MMOL/L (ref 98–107)
CHROMATIN AB SERPL-ACNC: <10 IU/ML (ref 0–14)
CO2 SERPL-SCNC: 25 MMOL/L (ref 22–29)
CREAT SERPL-MCNC: 0.62 MG/DL (ref 0.57–1)
CRP SERPL-MCNC: 5.4 MG/DL (ref 0–0.5)
D DIMER PPP FEU-MCNC: 1.64 MCGFEU/ML (ref 0–0.5)
DEPRECATED RDW RBC AUTO: 44.8 FL (ref 37–54)
EGFRCR SERPLBLD CKD-EPI 2021: 111.4 ML/MIN/1.73
EOSINOPHIL # BLD AUTO: 1.01 10*3/MM3 (ref 0–0.4)
EOSINOPHIL NFR BLD AUTO: 14.7 % (ref 0.3–6.2)
ERYTHROCYTE [DISTWIDTH] IN BLOOD BY AUTOMATED COUNT: 13.1 % (ref 12.3–15.4)
GLOBULIN UR ELPH-MCNC: 3.2 GM/DL
GLUCOSE SERPL-MCNC: 98 MG/DL (ref 65–99)
HCT VFR BLD AUTO: 35.3 % (ref 34–46.6)
HGB BLD-MCNC: 11.6 G/DL (ref 12–15.9)
IMM GRANULOCYTES # BLD AUTO: 0.02 10*3/MM3 (ref 0–0.05)
IMM GRANULOCYTES NFR BLD AUTO: 0.3 % (ref 0–0.5)
LV EF 2D ECHO EST: 65 %
LYMPHOCYTES # BLD AUTO: 1.78 10*3/MM3 (ref 0.7–3.1)
LYMPHOCYTES NFR BLD AUTO: 25.8 % (ref 19.6–45.3)
MCH RBC QN AUTO: 30.8 PG (ref 26.6–33)
MCHC RBC AUTO-ENTMCNC: 32.9 G/DL (ref 31.5–35.7)
MCV RBC AUTO: 93.6 FL (ref 79–97)
MONOCYTES # BLD AUTO: 0.39 10*3/MM3 (ref 0.1–0.9)
MONOCYTES NFR BLD AUTO: 5.7 % (ref 5–12)
NEUTROPHILS NFR BLD AUTO: 3.68 10*3/MM3 (ref 1.7–7)
NEUTROPHILS NFR BLD AUTO: 53.4 % (ref 42.7–76)
NRBC BLD AUTO-RTO: 0 /100 WBC (ref 0–0.2)
PLATELET # BLD AUTO: 228 10*3/MM3 (ref 140–450)
PMV BLD AUTO: 10.5 FL (ref 6–12)
POTASSIUM SERPL-SCNC: 3.6 MMOL/L (ref 3.5–5.2)
PROT SERPL-MCNC: 6.7 G/DL (ref 6–8.5)
RBC # BLD AUTO: 3.77 10*6/MM3 (ref 3.77–5.28)
SODIUM SERPL-SCNC: 140 MMOL/L (ref 136–145)
WBC NRBC COR # BLD: 6.89 10*3/MM3 (ref 3.4–10.8)

## 2023-10-06 PROCEDURE — 85598 HEXAGNAL PHOSPH PLTLT NEUTRL: CPT | Performed by: INTERNAL MEDICINE

## 2023-10-06 PROCEDURE — 99222 1ST HOSP IP/OBS MODERATE 55: CPT | Performed by: INTERNAL MEDICINE

## 2023-10-06 PROCEDURE — 93325 DOPPLER ECHO COLOR FLOW MAPG: CPT | Performed by: INTERNAL MEDICINE

## 2023-10-06 PROCEDURE — 85610 PROTHROMBIN TIME: CPT | Performed by: INTERNAL MEDICINE

## 2023-10-06 PROCEDURE — 85730 THROMBOPLASTIN TIME PARTIAL: CPT | Performed by: INTERNAL MEDICINE

## 2023-10-06 PROCEDURE — 25010000002 MIDAZOLAM PER 1 MG: Performed by: INTERNAL MEDICINE

## 2023-10-06 PROCEDURE — 85670 THROMBIN TIME PLASMA: CPT | Performed by: INTERNAL MEDICINE

## 2023-10-06 PROCEDURE — 99152 MOD SED SAME PHYS/QHP 5/>YRS: CPT | Performed by: INTERNAL MEDICINE

## 2023-10-06 PROCEDURE — 86146 BETA-2 GLYCOPROTEIN ANTIBODY: CPT | Performed by: INTERNAL MEDICINE

## 2023-10-06 PROCEDURE — 25010000002 FENTANYL CITRATE (PF) 50 MCG/ML SOLUTION: Performed by: INTERNAL MEDICINE

## 2023-10-06 PROCEDURE — 93312 ECHO TRANSESOPHAGEAL: CPT | Performed by: INTERNAL MEDICINE

## 2023-10-06 PROCEDURE — 99153 MOD SED SAME PHYS/QHP EA: CPT

## 2023-10-06 PROCEDURE — 25010000002 ENOXAPARIN PER 10 MG: Performed by: INTERNAL MEDICINE

## 2023-10-06 PROCEDURE — 85613 RUSSELL VIPER VENOM DILUTED: CPT | Performed by: INTERNAL MEDICINE

## 2023-10-06 PROCEDURE — 93320 DOPPLER ECHO COMPLETE: CPT

## 2023-10-06 PROCEDURE — 93320 DOPPLER ECHO COMPLETE: CPT | Performed by: INTERNAL MEDICINE

## 2023-10-06 PROCEDURE — G0378 HOSPITAL OBSERVATION PER HR: HCPCS

## 2023-10-06 PROCEDURE — 94799 UNLISTED PULMONARY SVC/PX: CPT

## 2023-10-06 PROCEDURE — 86038 ANTINUCLEAR ANTIBODIES: CPT | Performed by: INTERNAL MEDICINE

## 2023-10-06 PROCEDURE — 85025 COMPLETE CBC W/AUTO DIFF WBC: CPT | Performed by: HOSPITALIST

## 2023-10-06 PROCEDURE — 86140 C-REACTIVE PROTEIN: CPT | Performed by: INTERNAL MEDICINE

## 2023-10-06 PROCEDURE — 94664 DEMO&/EVAL PT USE INHALER: CPT

## 2023-10-06 PROCEDURE — 96372 THER/PROPH/DIAG INJ SC/IM: CPT

## 2023-10-06 PROCEDURE — 76376 3D RENDER W/INTRP POSTPROCES: CPT

## 2023-10-06 PROCEDURE — 93312 ECHO TRANSESOPHAGEAL: CPT

## 2023-10-06 PROCEDURE — 93325 DOPPLER ECHO COLOR FLOW MAPG: CPT

## 2023-10-06 PROCEDURE — 99152 MOD SED SAME PHYS/QHP 5/>YRS: CPT

## 2023-10-06 PROCEDURE — 86147 CARDIOLIPIN ANTIBODY EA IG: CPT | Performed by: INTERNAL MEDICINE

## 2023-10-06 PROCEDURE — 80053 COMPREHEN METABOLIC PANEL: CPT | Performed by: HOSPITALIST

## 2023-10-06 PROCEDURE — 85379 FIBRIN DEGRADATION QUANT: CPT | Performed by: INTERNAL MEDICINE

## 2023-10-06 RX ORDER — BENZONATATE 100 MG/1
200 CAPSULE ORAL 3 TIMES DAILY PRN
Status: DISCONTINUED | OUTPATIENT
Start: 2023-10-06 | End: 2023-10-06 | Stop reason: HOSPADM

## 2023-10-06 RX ORDER — POTASSIUM CHLORIDE 20 MEQ/1
40 TABLET, EXTENDED RELEASE ORAL EVERY 4 HOURS
Status: DISCONTINUED | OUTPATIENT
Start: 2023-10-06 | End: 2023-10-06 | Stop reason: HOSPADM

## 2023-10-06 RX ORDER — FENTANYL CITRATE 50 UG/ML
INJECTION, SOLUTION INTRAMUSCULAR; INTRAVENOUS
Status: COMPLETED | OUTPATIENT
Start: 2023-10-06 | End: 2023-10-06

## 2023-10-06 RX ORDER — MIDAZOLAM HYDROCHLORIDE 1 MG/ML
INJECTION INTRAMUSCULAR; INTRAVENOUS
Status: COMPLETED | OUTPATIENT
Start: 2023-10-06 | End: 2023-10-06

## 2023-10-06 RX ADMIN — FENTANYL CITRATE 50 MCG: 50 INJECTION, SOLUTION INTRAMUSCULAR; INTRAVENOUS at 11:07

## 2023-10-06 RX ADMIN — FENTANYL CITRATE 25 MCG: 50 INJECTION, SOLUTION INTRAMUSCULAR; INTRAVENOUS at 11:19

## 2023-10-06 RX ADMIN — ESCITALOPRAM OXALATE 20 MG: 20 TABLET ORAL at 08:21

## 2023-10-06 RX ADMIN — MIDAZOLAM HYDROCHLORIDE 1 MG: 1 INJECTION, SOLUTION INTRAMUSCULAR; INTRAVENOUS at 11:11

## 2023-10-06 RX ADMIN — MIDAZOLAM HYDROCHLORIDE 2 MG: 1 INJECTION, SOLUTION INTRAMUSCULAR; INTRAVENOUS at 11:07

## 2023-10-06 RX ADMIN — MIDAZOLAM HYDROCHLORIDE 1 MG: 1 INJECTION, SOLUTION INTRAMUSCULAR; INTRAVENOUS at 11:14

## 2023-10-06 RX ADMIN — POTASSIUM CHLORIDE 40 MEQ: 1500 TABLET, EXTENDED RELEASE ORAL at 08:20

## 2023-10-06 RX ADMIN — ACETAMINOPHEN 650 MG: 325 TABLET ORAL at 05:13

## 2023-10-06 RX ADMIN — FENTANYL CITRATE 25 MCG: 50 INJECTION, SOLUTION INTRAMUSCULAR; INTRAVENOUS at 11:14

## 2023-10-06 RX ADMIN — IPRATROPIUM BROMIDE AND ALBUTEROL SULFATE 3 ML: 2.5; .5 SOLUTION RESPIRATORY (INHALATION) at 12:46

## 2023-10-06 RX ADMIN — Medication 10 ML: at 08:21

## 2023-10-06 RX ADMIN — IPRATROPIUM BROMIDE AND ALBUTEROL SULFATE 3 ML: 2.5; .5 SOLUTION RESPIRATORY (INHALATION) at 08:04

## 2023-10-06 RX ADMIN — MIDAZOLAM HYDROCHLORIDE 1 MG: 1 INJECTION, SOLUTION INTRAMUSCULAR; INTRAVENOUS at 11:19

## 2023-10-06 RX ADMIN — FENTANYL CITRATE 25 MCG: 50 INJECTION, SOLUTION INTRAMUSCULAR; INTRAVENOUS at 11:11

## 2023-10-06 RX ADMIN — ENOXAPARIN SODIUM 40 MG: 100 INJECTION SUBCUTANEOUS at 08:22

## 2023-10-06 RX ADMIN — LOSARTAN POTASSIUM 100 MG: 50 TABLET, FILM COATED ORAL at 08:20

## 2023-10-06 NOTE — CASE MANAGEMENT/SOCIAL WORK
Continued Stay Note  [facility]     Patient Name: Lizette Gagnon  MRN: [id number]  Today's Date: 10/6/2023    Admit Date: 10/4/2023    Plan: Home with family   Discharge Plan       Row Name 10/06/23 0838       Plan    Plan Home with family    Patient/Family in Agreement with Plan yes    Plan Comments Spoke to patient at bedside. Plan is home with family. Patient denies any discharge needs at this time. CM will continue to follow.    Final Discharge Disposition Code 01 - home or self-care                   Discharge Codes    No documentation.                 Expected Discharge Date and Time       Expected Discharge Date Expected Discharge Time    Oct 6, 2023               Ry Wilkerson RN

## 2023-10-06 NOTE — TELEPHONE ENCOUNTER
"Per JAL, \"She can be on xarelto 15mg BID x 21 days then 20mg daily after that\".  Order placed. Discussed with patient.       Discussed with patient. Agreeable to plan. No further questions.    "

## 2023-10-06 NOTE — TELEPHONE ENCOUNTER
Patient called and left message stating she is currently being discharged from hospital and is a new patient for Dr. Arreola. She said he prescribed Eliquis. Looking through the chart Dr. Teran prescribed the Eliquis. However, she contacted her insurance and Xarelto is going to be cheaper.     Please Advise.

## 2023-10-06 NOTE — PROGRESS NOTES
[facility] Progress Note    Admission Date: 10/4/2023    Lizette Gagnon  1975  [id number]    Date: 10/6/2023    Antibiotics:  Anti-Infectives (From admission, onward)      None             Subjective      History of present illness:  Patient is a  48 y.o.  Yr old female smoker with HTN in her usual state of health until ~ 1 week ago when she developed cough, fever, congestion and transient sore throat after her granddaughter had been ill with similar symptoms She was evaluated at [facility] 4 days ago where flu/covid test was reportedly negative. She was started on doxycycline but had ongoing subjective fever and progressive SOA w/o chest pain. She presented to [facility] ED where WBC 8.8, Procal normal, LA normal, BNP normal, D-Dimer 2.48, Resp panel negative, and CTA showed abnormal 1 cm soft tissue arising from the pulmonic valve c/w a small vegetation or soft tissue mass, small bilateral pleural effusions with basilar atelectasis and right breast implant rupture.     Tm 99.7 Blood cultures drawn on admission are negative to date CRP 9 On TTE the PV was not well visualized    Her CTA was reviewed with Dr Kashif Betts who concurred with the very unusual finding at the PV and also noted  thickened AV on 1 window of the CT  She is unaware of a FH of hypercoagulable disorder, Collagen Vascular disease etc    10/6/23  Afebrile off antibiotics, no new c/o  Blood cultures negative  She is just back from [facility] and results are pending  CRP 9-> 5     ROS    Constitutional-- Subjective Fever.  HEENT-- No new vision, hearing or throat complaints- no sore throat at present  No epistaxis or oral sores.  Denies odynophagia or dysphagia.  No headache, photophobia or neck stiffness.  CV-- No chest pain, palpitation or syncope  Resp-- + SOA, + nonproductive cough, no hemoptysis  GI- No nausea, vomiting, or diarrhea. Denies jaundice or chronic liver disease.  -- No dysuria, hematuria, or flank pain.  Denies hesitancy, urgency or flank pain.  Heme-  No active bruising or bleeding; no Hx of DVT or PE.  MS-- no swelling or pain in the bones or joints of arms/legs.  No new back pain.  Neuro-- No acute focal weakness or numbness in the arms or legs.  No seizures.  Skin--No rashes or lesions        Medical History        Past Medical History:   Diagnosis Date    Hypertension              Surgical History   History reviewed. No pertinent surgical history.          PE:  Vital Signs  Temp  Min: 98 °F (36.7 °C)  Max: 99.5 °F (37.5 °C)  BP  Min: 125/113  Max: 171/105  Pulse  Min: 74  Max: 123  Resp  Min: 13  Max: 24  SpO2  Min: 91 %  Max: 100 %    GENERAL: Awake and alert, in no acute distress Appears stated age.   HEENT:  Normocephalic, atraumatic.  Oropharynx without thrush. Dentition in good repair.  Ears externally normal, Nose externally normal.  EYES: PERRL. No conjunctival injection. No icterus. EOM full.  HEART: RRR, no murmur  LUNGS: Clear to auscultation and percussion. No respiratory distress, no use of accessory muscles.  ABDOMEN: Soft, nontender, nondistended. Bowel sounds normal.  GENITAL: no Diego catheter  SKIN: no generalized rashes.  No peripheral stigmata of infective endocarditis noted.  PSYCHIATRIC: cooperative.  Appropriate mood and affect  EXT:  No cellulitic change.  NEURO: awake alert and oriented ×4.  Normal speech and cognition       Laboratory Data    Results from last 7 days   Lab Units 10/06/23  0418 10/05/23  0419 10/04/23  0344   WBC 10*3/mm3 6.89 8.40 8.87   HEMOGLOBIN g/dL 11.6* 11.4* 13.4   HEMATOCRIT % 35.3 34.7 41.7   PLATELETS 10*3/mm3 228 202 204     Results from last 7 days   Lab Units 10/06/23  0418   SODIUM mmol/L 140   POTASSIUM mmol/L 3.6   CHLORIDE mmol/L 105   CO2 mmol/L 25.0   BUN mg/dL 9   CREATININE mg/dL 0.62   GLUCOSE mg/dL 98   CALCIUM mg/dL 8.9     Results from last 7 days   Lab Units 10/06/23  0418   ALK PHOS U/L 161*   BILIRUBIN mg/dL 0.2   ALT (SGPT) U/L 16   AST (SGOT) U/L 13         Results from last 7 days   Lab  Units 10/06/23  0418   CRP mg/dL 5.40*       Estimated Creatinine Clearance: 129.9 mL/min (by C-G formula based on SCr of 0.62 mg/dL).      Microbiology:  Blood cultures negative    Radiology:  Imaging Results (Last 72 Hours)       Procedure Component Value Units Date/Time    CT Angiogram Chest [[id number]] Collected: 10/04/23 0610     Updated: 10/04/23 0632    Narrative:      CT ANGIOGRAM CHEST    Date of Exam: 10/4/2023 5:51 AM EDT    Indication: Pulmonary embolism (PE) suspected, unknown D-dimer.    Comparison: None available.    Technique: CTA of the chest was performed after the uneventful intravenous administration of 80 mL Isovue-370. Reconstructed coronal and sagittal images were also obtained. In addition, a 3-D volume rendered image was created for interpretation.   Automated exposure control and iterative reconstruction methods were used.    Findings:  There is excellent pulmonary arterial opacification.     There is a 1 cm low-density area seen within the main pulmonary artery which appears to be arising from the pulmonic valve leaflets. This is concerning for possible vegetation on the pulmonary valve. Other soft tissue etiologies such as sarcoma would be   in the differential although very rare and unlikely. Further characterization with cardiac echocardiography may be useful for characterization. An isolated thrombus on the pulmonic valve would be also very rare. Consider reimaging if this is possibly   artifactual.    There is no convincing right or left main pulmonary arterial embolism. There is no convincing pathologic adenopathy although there are several prominent lymph nodes in the AP window which do not meet CT size criteria for pathologically enlarged. Mild   bilateral hilar lymph node prominence is seen again not reaching CT size criteria for pathologically enlarged.    There are small bilateral pleural effusions. There is mild bilateral basilar atelectasis. Limited imaging of the upper solid  abdominal structures is within normal limits. The osseous structures are within normal limits. There is evidence of right breast   implant rupture.      Impression:      Impression:  1.Abnormal soft tissue appears to arise from the pulmonic valve measuring about 1 cm. This could represent a small vegetation or soft tissue mass. Isolated thrombus in this region would be unusual.  2.There is no obvious pulmonary embolism.  3.Small bilateral pleural effusions with basilar atelectasis.  4.Right breast implant rupture.      Electronically Signed: Jimmy Ventura MD    10/4/2023 6:29 AM EDT    Workstation ID: [id number]    XR Chest 1 View [[id number]] Collected: 10/04/23 0416     Updated: 10/04/23 0419    Narrative:      XR CHEST 1 VW    Date of Exam: 10/4/2023 3:56 AM EDT    Indication: SOA triage protocol    Comparison: None available.    Findings:  There are no airspace consolidations. No pleural fluid. No pneumothorax. The pulmonary vasculature appears within normal limits. The cardiac and mediastinal silhouette appear unremarkable. No acute osseous abnormality identified.      Impression:      Impression:  No active disease      Electronically Signed: Jimmy Ventura MD    10/4/2023 4:16 AM EDT    Workstation ID: [id number]            I personally reviewed the radiographic studies     IMPRESSION:      -- Acute febrile illness with URTI and LRTI symptoms  Resp panel negative No improvement on doxycycline CT findings included bibasilar atelectasis and small pleural effusions      -- PV valve abnormality seen on CTA but not on TTE and possible AV abnormality    Blood cultures negative but are antibiotic modified   PV bacterial endocarditis is quite rare but evidently a significant proportion of patients with this infection have no identifiable risk factors  I would also be concerned about the possibility of marantic endocarditis based on her elevated d-dimer and CRP      -- HTN     -- Tobacco abuse     RECOMMENDATIONS:     -- stop  doxycycline and observe off antibiotics     -- proceed with [facility]- pending     -- CHELA, Rheumatoid factor, Antiphospholipid profile etc         Thank you for asking me to see Lizette Gagnon.  Our group would be pleased to follow this patient over the course of their hospitalization and assist with outpatient antimicrobial therapy, as indicated.  Further recommendations depend on the results of the cultures and clinical course.     Valarie Chow MD  10/5/2023       Valarie Chow MD  10/6/2023

## 2023-10-06 NOTE — PROGRESS NOTES
Patient is on Apixaban.  Education provided on 10/6/23 verbally and in writing.  Information provided includes effects of medication, drug-drug and drug-food interactions, and signs/symptoms of bleeding and clotting.  Patient verbalized understanding through teach back.  All pertinent questions were answered.

## 2023-10-06 NOTE — CONSULTS
[facility] Cardiology  Initial Consult Note      Patient Identification:  Lizette Gagnon        48 y.o.        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 with a history of hypertension and tobacco use who presented to the emergency department on October 4 with cough and congestion.  She had been seen at the ER at [facility] [place] previously and started on doxycycline after negative fluid COVID testing however her symptoms did not improve and she presented to our ER.  There, lab testing showed an elevated D-dimer and she was sent for CT PE which showed evidence of pulmonic valve mass.  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  Further testing has shown elevated ESR and CRP but no evidence of bacteremia.  The patient has felt ill for about a week and does have a granddaughter with similar symptoms.  She denies IVDU and has not had longer-lasting symptoms, fevers, night sweats.    Past History:  Past Medical History:   Diagnosis Date    Hypertension      History reviewed. No pertinent surgical history.    Allergies   Allergen Reactions    Nuts Shortness Of Breath    Penicillins Shortness Of Breath and Dizziness     PEN-FAST = 3+, high risk for true allergy     Social History     Socioeconomic History    Marital status: Single   Tobacco Use    Smoking status: Every Day     Packs/day: 1.00     Years: 30.00     Pack years: 30.00     Types: Cigarettes    Smokeless tobacco: Never   Substance and Sexual Activity    Alcohol use: Yes     Alcohol/week: 10.0 standard drinks     Types: 5 Cans of beer, 5 Shots of liquor per week    Drug use: Never    Sexual  activity: Defer     History reviewed. No pertinent family history.  Medications:  Medications Prior to Admission   Medication Sig Dispense Refill Last Dose    doxycycline (VIBRAMYCIN) 100 MG capsule Take 1 capsule by mouth 2 (Two) Times a Day.       escitalopram (LEXAPRO) 20 MG tablet escitalopram 20 mg tablet   Take 1 tablet by mouth once daily       losartan (COZAAR) 50 MG tablet 2 tablets.       minocycline (MINOCIN,DYNACIN) 50 MG capsule Take 75 mg by mouth Daily.       Cholecalciferol 25 MCG (1000 UT) tablet Take 1 tablet by mouth Daily.       ibuprofen (ADVIL,MOTRIN) 200 MG tablet Take 1 tablet by mouth Every 6 (Six) Hours As Needed for Mild Pain.       Loratadine 10 MG capsule Take  by mouth.       Pseudoephedrine-APAP-DM (DAYQUIL MULTI-SYMPTOM COLD/FLU PO) Take  by mouth.       Thiamine HCl (vitamin B-1) 50 MG tablet Take 1 tablet by mouth Daily.       VITAMIN E 1000 UNIT capsule Take 1 capsule by mouth Daily.        Current medications:  apixaban, 10 mg, Oral, Q12H   Followed by  [START ON 10/13/2023] apixaban, 5 mg, Oral, Q12H  escitalopram, 20 mg, Oral, Daily  fluticasone, 2 spray, Each Nare, Daily  ipratropium-albuterol, 3 mL, Nebulization, 4x Daily - RT  losartan, 100 mg, Oral, Q24H  nicotine, 1 patch, Transdermal, Q24H  potassium chloride ER, 40 mEq, Oral, Q4H  senna-docusate sodium, 2 tablet, Oral, BID  sodium chloride, 10 mL, Intravenous, Q12H      Current IV drips:       Review of Systems   Constitutional: Positive for diaphoresis.   HENT:  Positive for congestion and sore throat.    Cardiovascular:  Negative for chest pain and dyspnea on exertion.   Respiratory:  Positive for cough.      Physical exam:  Temp:  [98 °F (36.7 °C)-99.5 °F (37.5 °C)] 98.1 °F (36.7 °C)  Heart Rate:  [] 103  Resp:  [13-24] 18  BP: (125-171)/() 135/106  Body mass index is 32.97 kg/m².    Gen: well developed, lying in bed, comfortable appearing  HEENT: MMM, sclerae anicteric, conjunctivae normal  CV: regular  rate, regular rhythm, no murmurs or rubs, normal S1, S2. 2+ radial and DP pulses  Pulm: RA, normal work of breathing, no wheezes, rales, rhonchi  Abd: soft, nontender, nondistended  Ext: normal bulk for age, normal tone, no dependent edema  Neuro: alert, oriented, face symmetrical, moving all extremities well  Psych: normal mood, appropriate affect    Cardiac Testing:    ECG  (personally reviewed) SR, LAE, otherwise normal    ECHO:   Results for orders placed during the hospital encounter of 10/04/23    Adult Transthoracic Echo Complete W/ Cont if Necessary Per Protocol    Interpretation Summary    Left ventricular systolic function is normal. Estimated left ventricular EF = 65%    Trace tricuspid regurgitation with normal RVSP.    XR Chest 1 View    Result Date: 10/4/2023  Impression: No active disease Electronically Signed: Jimmy Ventura MD  10/4/2023 4:16 AM EDT  Workstation ID: [id number]    XR Chest 1 View    Result Date: 10/2/2023  No acute cardiopulmonary findings. Images personally reviewed, interpreted and dictated by ESSENCE Dickinson M.D.    CT Angiogram Chest    Result Date: 10/4/2023  Impression: 1.Abnormal soft tissue appears to arise from the pulmonic valve measuring about 1 cm. This could represent a small vegetation or soft tissue mass. Isolated thrombus in this region would be unusual. 2.There is no obvious pulmonary embolism. 3.Small bilateral pleural effusions with basilar atelectasis. 4.Right breast implant rupture. Electronically Signed: Jimmy Ventura MD  10/4/2023 6:29 AM EDT  Workstation ID: [id number]       Lab Review:    Lab Results   Component Value Date    WBC 6.89 10/06/2023    HGB 11.6 (L) 10/06/2023    HCT 35.3 10/06/2023    MCV 93.6 10/06/2023     10/06/2023     Lab Results   Component Value Date    GLUCOSE 98 10/06/2023    BUN 9 10/06/2023    CREATININE 0.62 10/06/2023    BCR 14.5 10/06/2023    K 3.6 10/06/2023    CO2 25.0 10/06/2023    CALCIUM 8.9 10/06/2023    ALBUMIN 3.5 10/06/2023     AST 13 10/06/2023    ALT 16 10/06/2023     Lab Results   Component Value Date    TROPONINT 7 10/04/2023     Lab Results   Component Value Date    PROBNP 188.7 10/04/2023     Imaging:  All pertinent imaging studies were personally reviewed.    Assessment & Plan    Abnormal CT scan, chest    Bronchitis    Hypokalemia    Pulmonic valve mass   - Seen on CTA, redemonstrated on REILLY   - No significant valvular dysfunction and isolated PV endocarditis would be rare particularly with no risk factors such as IVDU.  Agree with monitoring blood cultures for evidence of infection.    - No PE seen on CTA   - We will await the remainder of her autoimmune/inflammatory work-up   - For the time being would recommend anticoagulation and we will repeat REILLY in 2 to 3 months.   - If the mass persists despite anticoagulation further imaging with cardiac MRI may be beneficial.  Differential includes benign cardiac tumor such as papillary fibroblastoma however does appear to be on the ventricular rather than arterial side of the valve.    Thank you for allowing us to share in the care of Lizette Gagnon    Discussed with patient's nurse, Dr. Arrieta, patient's family      KOREY Arreola MD  10/06/23   13:46 EDT

## 2023-10-06 NOTE — DISCHARGE SUMMARY
[facility] Medicine Services  DISCHARGE SUMMARY    Patient Name: Lizette Gagnon  : 1975  MRN: [id number]    Date of Admission: 10/4/2023  3:34 AM  Date of Discharge:  10/06/23  Primary Care Physician: April Sutherland MD    Consults       Date and Time Order Name Status Description    10/5/2023  7:40 AM Inpatient Infectious Diseases Consult Completed             Hospital Course     Presenting Problem: URI infection.    Active Hospital Problems    Diagnosis  POA    **Abnormal CT scan, chest [R93.89]  Yes    Bronchitis [J40]  Yes    Hypokalemia [E87.6]  Yes      Resolved Hospital Problems   No resolved problems to display.      Hospital Course:  Lizette Gagnon is a 48 y.o. female w heavy smoking history, HTN who presented with 5 days of cough, sore throat, nasal stuffiness, subjective fever c/w viral UTI. Found to have concern for pulmonary valve lesion on CTA chest.     Pulmonic valve mass. Unknown pathology D/D thrombus vs benign cardiac tumor ?  - No signs of infective endocarditis.  - afebrile. BCX negative.  - ID consulted. Recommend REILLY and moniter of abx.  - REILLY There is a 1.1 x 1.0cm mass adherent to the ventricular surface of the pulmonic valve that prolapses into the PA with systole.    No other evidence of endocarditis or vegetation of the cardiac valves.  No intracardiac mass or thrombi seen.  - cardiology consulted. D/D as mentioned above.   - Recommendations per cardiology: For the time being would recommend anticoagulation and we will repeat REILLY in 2 to 3 months. Discharged on Xarelto.   - If the mass persists despite anticoagulation further imaging with cardiac MRI may be beneficial.  Differential includes benign cardiac tumor such as papillary fibroblastoma however does appear to be on the ventricular rather than arterial side of the valve.  - CHELA, Rheumatoid factor, Antiphospholipid profile pending.     Viral URI syndrome  -not requiring O2  -repeat  COVID/flu/viral testing  -continue home doxycycline, mucinex, duonebs     Discharge Follow Up Recommendations for outpatient labs/diagnostics:  CHELA, Rheumatoid factor, Antiphospholipid profile pending.  Need repeated REILLY in 2 to 3 months.  Follow up with pcp in 1 week.       Day of Discharge     HPI:   Seen and examined.    Review of Systems  No fever. Chills CP or dysuria.    Vital Signs:          Physical Exam:  Constitutional: Awake, alert female. Not toxic appearing  Eyes: PERRLA, sclerae anicteric, no conjunctival injection  HENT: NCAT, mucous membranes moist  Neck: Supple, no thyromegaly, no lymphadenopathy, trachea midline  Respiratory: Upper airway congestive sounds, but clear to auscultation bilaterally, mildly increased WOB on room air  Cardiovascular: RRR, no murmurs, rubs, or gallops, palpable pedal pulses bilaterally  Gastrointestinal: Positive bowel sounds, soft, nontender, nondistended  Musculoskeletal: No bilateral ankle edema, no clubbing or cyanosis to extremities  Psychiatric: Appropriate affect, cooperative  Neurologic: Oriented x 3, strength symmetric in all extremities, Cranial Nerves grossly intact to confrontation, speech clear  Skin: No rashes, toes/fingers examined without any lesions    Pertinent  and/or Most Recent Results     LAB RESULTS:      Lab 10/07/23  0254 10/06/23  0418 10/05/23  0419 10/04/23  0344   WBC 10.41 6.89 8.40 8.87   HEMOGLOBIN 12.5 11.6* 11.4* 13.4   HEMATOCRIT 38.7 35.3 34.7 41.7   PLATELETS 259 228 202 204   NEUTROS ABS 7.15* 3.68 5.99 7.11*   IMMATURE GRANS (ABS) 0.06* 0.02 0.05 0.02   LYMPHS ABS 1.82 1.78 1.29 1.02   MONOS ABS 0.37 0.39 0.40 0.39   EOS ABS 1.00* 1.01* 0.66* 0.32   MCV 96.5 93.6 93.3 93.9   CRP  --  5.40* 9.23*  --    PROCALCITONIN  --   --   --  0.07   LACTATE  --   --   --  1.0   D DIMER QUANT  --  1.64*  --  2.48*         Lab 10/07/23  0254 10/06/23  0418 10/05/23  0419 10/04/23  0344   SODIUM 140 140 138 141   POTASSIUM 3.6 3.6 4.0 3.2*    CHLORIDE 105 105 107 104   CO2 26.0 25.0 20.0* 25.0   ANION GAP 9.0 10.0 11.0 12.0   BUN 8 9 8 10   CREATININE 0.62 0.62 0.56* 0.65   EGFR 111.4 111.4 114.1 110.1   GLUCOSE 119* 98 129* 109*   CALCIUM 9.1 8.9 9.1 8.9         Lab 10/07/23  0254 10/06/23  0418 10/04/23  0344   TOTAL PROTEIN 6.6 6.7 7.2   ALBUMIN 3.6 3.5 3.9   GLOBULIN 3.0 3.2 3.3   ALT (SGPT) 17 16 16   AST (SGOT) 23 13 17   BILIRUBIN 0.2 0.2 0.3   ALK PHOS 176* 161* 132*         Lab 10/07/23  0512 10/07/23  0254 10/04/23  0344   PROBNP  --  300.7 188.7   HSTROP T 17* 17* 7                 Brief Urine Lab Results       None          Microbiology Results (last 10 days)       Procedure Component Value - Date/Time    Blood Culture - Blood, Arm, Right [[id number]]  (Normal) Collected: 10/04/23 0811    Lab Status: Preliminary result Specimen: Blood from Arm, Right Updated: 10/07/23 0930     Blood Culture No growth at 3 days    Respiratory Panel PCR w/COVID-19(SARS-CoV-2) [facility]INDER/BLAS/[facility] In-House, NP Swab in UTM/VTM, 3-4 HR TAT - Swab, Nasopharynx [[id number]]  (Normal) Collected: 10/04/23 0740    Lab Status: Final result Specimen: Swab from Nasopharynx Updated: 10/04/23 0920     ADENOVIRUS, PCR Not Detected     Coronavirus 229E Not Detected     Coronavirus HKU1 Not Detected     Coronavirus NL63 Not Detected     Coronavirus OC43 Not Detected     COVID19 Not Detected     Human Metapneumovirus Not Detected     Human Rhinovirus/Enterovirus Not Detected     Influenza A PCR Not Detected     Influenza B PCR Not Detected     Parainfluenza Virus 1 Not Detected     Parainfluenza Virus 2 Not Detected     Parainfluenza Virus 3 Not Detected     Parainfluenza Virus 4 Not Detected     RSV, PCR Not Detected     Bordetella pertussis pcr Not Detected     Bordetella parapertussis PCR Not Detected     Chlamydophila pneumoniae PCR Not Detected     Mycoplasma pneumo by PCR Not Detected    Narrative:      In the setting of a positive respiratory panel with a viral  infection PLUS a negative procalcitonin without other underlying concern for bacterial infection, consider observing off antibiotics or discontinuation of antibiotics and continue supportive care. If the respiratory panel is positive for atypical bacterial infection (Bordetella pertussis, Chlamydophila pneumoniae, or Mycoplasma pneumoniae), consider antibiotic de-escalation to target atypical bacterial infection.    Blood Culture - Blood, Arm, Right [[id number]]  (Normal) Collected: 10/04/23 0713    Lab Status: Preliminary result Specimen: Blood from Arm, Right Updated: 10/07/23 0746     Blood Culture No growth at 3 days            XR Chest 1 View    Result Date: 10/7/2023  XR CHEST 1 VW Date of Exam: 10/7/2023 3:10 AM EDT Indication: SOA triage protocol Comparison: CT chest 10/14/2023 and chest radiograph also 10/4/2023. Findings: There is no pneumothorax, pleural effusion or focal airspace consolidation. Heart size and pulmonary vasculature appear within normal limits. Regional bones appear intact.     Impression: No acute cardiopulmonary abnormality. Electronically Signed: Jasvir Robertson MD  10/7/2023 3:36 AM EDT  Workstation ID: [id number]    Adult Transesophageal Echo 3D (REILLY) W/ Cont If Necessary Per Protocol    Result Date: 10/6/2023    Left ventricular systolic function is normal. Estimated left ventricular EF = 65% Normal left ventricular cavity size and wall thickness noted. All left ventricular wall segments contract normally.   There is a 1.1 x 1.0cm mass adherent to the ventricular surface of the pulmonic valve that prolapses into the PA with systole.   No other evidence of endocarditis or vegetation of the cardiac valves.  No intracardiac mass or thrombi seen.     Adult Transthoracic Echo Complete W/ Cont if Necessary Per Protocol    Result Date: 10/5/2023    Left ventricular systolic function is normal. Estimated left ventricular EF = 65%   Trace tricuspid regurgitation with normal RVSP.     CT Angiogram  Chest    Result Date: 10/4/2023  CT ANGIOGRAM CHEST Date of Exam: 10/4/2023 5:51 AM EDT Indication: Pulmonary embolism (PE) suspected, unknown D-dimer. Comparison: None available. Technique: CTA of the chest was performed after the uneventful intravenous administration of 80 mL Isovue-370. Reconstructed coronal and sagittal images were also obtained. In addition, a 3-D volume rendered image was created for interpretation. Automated exposure control and iterative reconstruction methods were used. Findings: There is excellent pulmonary arterial opacification. There is a 1 cm low-density area seen within the main pulmonary artery which appears to be arising from the pulmonic valve leaflets. This is concerning for possible vegetation on the pulmonary valve. Other soft tissue etiologies such as sarcoma would be in the differential although very rare and unlikely. Further characterization with cardiac echocardiography may be useful for characterization. An isolated thrombus on the pulmonic valve would be also very rare. Consider reimaging if this is possibly artifactual. There is no convincing right or left main pulmonary arterial embolism. There is no convincing pathologic adenopathy although there are several prominent lymph nodes in the AP window which do not meet CT size criteria for pathologically enlarged. Mild bilateral hilar lymph node prominence is seen again not reaching CT size criteria for pathologically enlarged. There are small bilateral pleural effusions. There is mild bilateral basilar atelectasis. Limited imaging of the upper solid abdominal structures is within normal limits. The osseous structures are within normal limits. There is evidence of right breast implant rupture.     Impression: 1.Abnormal soft tissue appears to arise from the pulmonic valve measuring about 1 cm. This could represent a small vegetation or soft tissue mass. Isolated thrombus in this region would be unusual. 2.There is no  obvious pulmonary embolism. 3.Small bilateral pleural effusions with basilar atelectasis. 4.Right breast implant rupture. Electronically Signed: Jimmy Ventura MD  10/4/2023 6:29 AM EDT  Workstation ID: [id number]    XR Chest 1 View    Result Date: 10/4/2023  XR CHEST 1 VW Date of Exam: 10/4/2023 3:56 AM EDT Indication: SOA triage protocol Comparison: None available. Findings: There are no airspace consolidations. No pleural fluid. No pneumothorax. The pulmonary vasculature appears within normal limits. The cardiac and mediastinal silhouette appear unremarkable. No acute osseous abnormality identified.     Impression: No active disease Electronically Signed: Jimmy Ventura MD  10/4/2023 4:16 AM EDT  Workstation ID: [id number]    XR Chest 1 View    Result Date: 10/2/2023  EXAMINATION TECHNIQUE: XR CHEST 1 VIEW PORTABLE / BEDSIDE CLINICAL HISTORY: SHORTNESS OF BREATH COMPARISON: None. FINDINGS: No dense consolidation. No pneumothorax or pleural effusion. Normal heart size. Old right rib deformities.    No acute cardiopulmonary findings. Images personally reviewed, interpreted and dictated by ESSENCE Dickinson M.D.             Results for orders placed during the hospital encounter of 10/04/23    Adult Transesophageal Echo 3D (REILLY) W/ Cont If Necessary Per Protocol    Interpretation Summary    Left ventricular systolic function is normal. Estimated left ventricular EF = 65% Normal left ventricular cavity size and wall thickness noted. All left ventricular wall segments contract normally.    There is a 1.1 x 1.0cm mass adherent to the ventricular surface of the pulmonic valve that prolapses into the PA with systole.    No other evidence of endocarditis or vegetation of the cardiac valves.  No intracardiac mass or thrombi seen.      Plan for Follow-up of Pending Labs/Results:   Pending Labs       Order Current Status    CHELA In process    Antiphospholipid Syndrome In process    Blood Culture - Blood, Arm, Right Preliminary result     Blood Culture - Blood, Arm, Right Preliminary result          Discharge Details        Discharge Medications        New Medications        Instructions Start Date   rivaroxaban 15 MG tablet  Commonly known as: XARELTO   15 mg, Oral, 2 Times Daily With Meals, Take 15 mg BID for 21 days.      rivaroxaban 20 MG tablet  Commonly known as: XARELTO  Notes to patient: START THIS AFTER TAKING 15 MG TWICE A DAY FOR 21 DAYS   20 mg, Oral, Daily             Continue These Medications        Instructions Start Date   cholecalciferol 25 MCG (1000 UT) tablet  Commonly known as: VITAMIN D3   1,000 Units, Oral, Daily      DAYQUIL MULTI-SYMPTOM COLD/FLU PO   Oral      escitalopram 20 MG tablet  Commonly known as: LEXAPRO   escitalopram 20 mg tablet   Take 1 tablet by mouth once daily      Loratadine 10 MG capsule   Oral      losartan 50 MG tablet  Commonly known as: COZAAR   2 tablets.      vitamin B-1 50 MG tablet   50 mg, Oral, Daily      VITAMIN E 1000 UNIT capsule  Generic drug: vitamin E   1,000 Units, Oral, Daily             Stop These Medications      doxycycline 100 MG capsule  Commonly known as: VIBRAMYCIN     ibuprofen 200 MG tablet  Commonly known as: ADVIL,MOTRIN     minocycline 50 MG capsule  Commonly known as: MINOCIN,DYNACIN              Allergies   Allergen Reactions    Nuts Shortness Of Breath    Penicillins Shortness Of Breath and Dizziness     PEN-FAST = 3+, high risk for true allergy         Discharge Disposition:  Home or Self Care    Diet:  Hospital:  No active diet order             Restrictions or Other Recommendations: Activityas tolerated.       CODE STATUS:    Code Status and Medical Interventions:   Ordered at: 10/04/23 0819     Level Of Support Discussed With:    Patient     Code Status (Patient has no pulse and is not breathing):    CPR (Attempt to Resuscitate)     Medical Interventions (Patient has pulse or is breathing):    Full Support       No future appointments.      Wilbert Teran  MD  10/07/23      Time Spent on Discharge:  I spent  35  minutes on this discharge activity which included: face-to-face encounter with the patient, reviewing the data in the system, coordination of the care with the nursing staff as well as consultants, documentation, and entering orders.

## 2023-10-06 NOTE — PLAN OF CARE
Goal Outcome Evaluation:  Plan of Care Reviewed With: patient        Progress: no change  Outcome Evaluation: Patient rested well this shift. Plan for REILLY today. Continue current POC

## 2023-10-07 ENCOUNTER — APPOINTMENT (OUTPATIENT)
Dept: GENERAL RADIOLOGY | Facility: HOSPITAL | Age: 48
End: 2023-10-07
Payer: COMMERCIAL

## 2023-10-07 ENCOUNTER — HOSPITAL ENCOUNTER (EMERGENCY)
Facility: HOSPITAL | Age: 48
Discharge: HOME | End: 2023-10-07
Attending: EMERGENCY MEDICINE
Payer: COMMERCIAL

## 2023-10-07 VITALS
WEIGHT: 200 LBS | HEIGHT: 66 IN | BODY MASS INDEX: 32.14 KG/M2 | RESPIRATION RATE: 21 BRPM | HEART RATE: 111 BPM | OXYGEN SATURATION: 91 % | DIASTOLIC BLOOD PRESSURE: 99 MMHG | SYSTOLIC BLOOD PRESSURE: 153 MMHG | TEMPERATURE: 98.8 F

## 2023-10-07 DIAGNOSIS — R21 RASH: ICD-10-CM

## 2023-10-07 DIAGNOSIS — R06.02 SHORTNESS OF BREATH: Primary | ICD-10-CM

## 2023-10-07 LAB
ALBUMIN SERPL-MCNC: 3.6 G/DL (ref 3.5–5.2)
ALBUMIN/GLOB SERPL: 1.2 G/DL
ALP SERPL-CCNC: 176 U/L (ref 39–117)
ALT SERPL W P-5'-P-CCNC: 17 U/L (ref 1–33)
ANION GAP SERPL CALCULATED.3IONS-SCNC: 9 MMOL/L (ref 5–15)
AST SERPL-CCNC: 23 U/L (ref 1–32)
BASOPHILS # BLD AUTO: 0.01 10*3/MM3 (ref 0–0.2)
BASOPHILS NFR BLD AUTO: 0.1 % (ref 0–1.5)
BILIRUB SERPL-MCNC: 0.2 MG/DL (ref 0–1.2)
BUN SERPL-MCNC: 8 MG/DL (ref 6–20)
BUN/CREAT SERPL: 12.9 (ref 7–25)
CALCIUM SPEC-SCNC: 9.1 MG/DL (ref 8.6–10.5)
CHLORIDE SERPL-SCNC: 105 MMOL/L (ref 98–107)
CO2 SERPL-SCNC: 26 MMOL/L (ref 22–29)
CREAT SERPL-MCNC: 0.62 MG/DL (ref 0.57–1)
DEPRECATED RDW RBC AUTO: 47.6 FL (ref 37–54)
EGFRCR SERPLBLD CKD-EPI 2021: 111.4 ML/MIN/1.73
EOSINOPHIL # BLD AUTO: 1 10*3/MM3 (ref 0–0.4)
EOSINOPHIL NFR BLD AUTO: 9.6 % (ref 0.3–6.2)
ERYTHROCYTE [DISTWIDTH] IN BLOOD BY AUTOMATED COUNT: 13.2 % (ref 12.3–15.4)
GLOBULIN UR ELPH-MCNC: 3 GM/DL
GLUCOSE SERPL-MCNC: 119 MG/DL (ref 65–99)
HCT VFR BLD AUTO: 38.7 % (ref 34–46.6)
HGB BLD-MCNC: 12.5 G/DL (ref 12–15.9)
HOLD SPECIMEN: NORMAL
IMM GRANULOCYTES # BLD AUTO: 0.06 10*3/MM3 (ref 0–0.05)
IMM GRANULOCYTES NFR BLD AUTO: 0.6 % (ref 0–0.5)
LYMPHOCYTES # BLD AUTO: 1.82 10*3/MM3 (ref 0.7–3.1)
LYMPHOCYTES NFR BLD AUTO: 17.5 % (ref 19.6–45.3)
MCH RBC QN AUTO: 31.2 PG (ref 26.6–33)
MCHC RBC AUTO-ENTMCNC: 32.3 G/DL (ref 31.5–35.7)
MCV RBC AUTO: 96.5 FL (ref 79–97)
MONOCYTES # BLD AUTO: 0.37 10*3/MM3 (ref 0.1–0.9)
MONOCYTES NFR BLD AUTO: 3.6 % (ref 5–12)
NEUTROPHILS NFR BLD AUTO: 68.6 % (ref 42.7–76)
NEUTROPHILS NFR BLD AUTO: 7.15 10*3/MM3 (ref 1.7–7)
NRBC BLD AUTO-RTO: 0 /100 WBC (ref 0–0.2)
NT-PROBNP SERPL-MCNC: 300.7 PG/ML (ref 0–450)
PLATELET # BLD AUTO: 259 10*3/MM3 (ref 140–450)
PMV BLD AUTO: 10.1 FL (ref 6–12)
POTASSIUM SERPL-SCNC: 3.6 MMOL/L (ref 3.5–5.2)
PROT SERPL-MCNC: 6.6 G/DL (ref 6–8.5)
RBC # BLD AUTO: 4.01 10*6/MM3 (ref 3.77–5.28)
SODIUM SERPL-SCNC: 140 MMOL/L (ref 136–145)
TROPONIN T SERPL HS-MCNC: 17 NG/L
TROPONIN T SERPL HS-MCNC: 17 NG/L
WBC NRBC COR # BLD: 10.41 10*3/MM3 (ref 3.4–10.8)
WHOLE BLOOD HOLD COAG: NORMAL
WHOLE BLOOD HOLD SPECIMEN: NORMAL

## 2023-10-07 PROCEDURE — 36415 COLL VENOUS BLD VENIPUNCTURE: CPT

## 2023-10-07 PROCEDURE — 93005 ELECTROCARDIOGRAM TRACING: CPT

## 2023-10-07 PROCEDURE — 99284 EMERGENCY DEPT VISIT MOD MDM: CPT

## 2023-10-07 PROCEDURE — 25010000002 METHYLPREDNISOLONE PER 125 MG: Performed by: EMERGENCY MEDICINE

## 2023-10-07 PROCEDURE — 84484 ASSAY OF TROPONIN QUANT: CPT

## 2023-10-07 PROCEDURE — 93005 ELECTROCARDIOGRAM TRACING: CPT | Performed by: EMERGENCY MEDICINE

## 2023-10-07 PROCEDURE — 85025 COMPLETE CBC W/AUTO DIFF WBC: CPT

## 2023-10-07 PROCEDURE — 94640 AIRWAY INHALATION TREATMENT: CPT

## 2023-10-07 PROCEDURE — 25010000002 DIPHENHYDRAMINE PER 50 MG: Performed by: EMERGENCY MEDICINE

## 2023-10-07 PROCEDURE — 96374 THER/PROPH/DIAG INJ IV PUSH: CPT

## 2023-10-07 PROCEDURE — 84484 ASSAY OF TROPONIN QUANT: CPT | Performed by: EMERGENCY MEDICINE

## 2023-10-07 PROCEDURE — 96375 TX/PRO/DX INJ NEW DRUG ADDON: CPT

## 2023-10-07 PROCEDURE — 71045 X-RAY EXAM CHEST 1 VIEW: CPT

## 2023-10-07 PROCEDURE — 83880 ASSAY OF NATRIURETIC PEPTIDE: CPT

## 2023-10-07 PROCEDURE — 94799 UNLISTED PULMONARY SVC/PX: CPT

## 2023-10-07 PROCEDURE — 80053 COMPREHEN METABOLIC PANEL: CPT

## 2023-10-07 RX ORDER — FAMOTIDINE 10 MG/ML
20 INJECTION, SOLUTION INTRAVENOUS ONCE
Status: DISCONTINUED | OUTPATIENT
Start: 2023-10-07 | End: 2023-10-07

## 2023-10-07 RX ORDER — SODIUM CHLORIDE 0.9 % (FLUSH) 0.9 %
10 SYRINGE (ML) INJECTION AS NEEDED
Status: DISCONTINUED | OUTPATIENT
Start: 2023-10-07 | End: 2023-10-07 | Stop reason: HOSPADM

## 2023-10-07 RX ORDER — DIPHENHYDRAMINE HYDROCHLORIDE 50 MG/ML
50 INJECTION INTRAMUSCULAR; INTRAVENOUS ONCE
Status: COMPLETED | OUTPATIENT
Start: 2023-10-07 | End: 2023-10-07

## 2023-10-07 RX ORDER — IPRATROPIUM BROMIDE AND ALBUTEROL SULFATE 2.5; .5 MG/3ML; MG/3ML
3 SOLUTION RESPIRATORY (INHALATION) ONCE
Status: COMPLETED | OUTPATIENT
Start: 2023-10-07 | End: 2023-10-07

## 2023-10-07 RX ORDER — FAMOTIDINE 10 MG/ML
20 INJECTION, SOLUTION INTRAVENOUS ONCE
Status: COMPLETED | OUTPATIENT
Start: 2023-10-07 | End: 2023-10-07

## 2023-10-07 RX ORDER — ALBUTEROL SULFATE 90 UG/1
2 AEROSOL, METERED RESPIRATORY (INHALATION) EVERY 4 HOURS PRN
Qty: 18 G | Refills: 0 | Status: SHIPPED | OUTPATIENT
Start: 2023-10-07

## 2023-10-07 RX ORDER — PREDNISONE 10 MG/1
TABLET ORAL
Qty: 63 TABLET | Refills: 0 | Status: SHIPPED | OUTPATIENT
Start: 2023-10-07

## 2023-10-07 RX ORDER — ALBUTEROL SULFATE 2.5 MG/3ML
2.5 SOLUTION RESPIRATORY (INHALATION) EVERY 4 HOURS PRN
Qty: 25 EACH | Refills: 1 | Status: SHIPPED | OUTPATIENT
Start: 2023-10-07

## 2023-10-07 RX ORDER — METHYLPREDNISOLONE SODIUM SUCCINATE 125 MG/2ML
125 INJECTION, POWDER, LYOPHILIZED, FOR SOLUTION INTRAMUSCULAR; INTRAVENOUS ONCE
Status: COMPLETED | OUTPATIENT
Start: 2023-10-07 | End: 2023-10-07

## 2023-10-07 RX ADMIN — FAMOTIDINE 20 MG: 10 INJECTION INTRAVENOUS at 04:17

## 2023-10-07 RX ADMIN — METHYLPREDNISOLONE SODIUM SUCCINATE 125 MG: 125 INJECTION, POWDER, FOR SOLUTION INTRAMUSCULAR; INTRAVENOUS at 04:16

## 2023-10-07 RX ADMIN — DIPHENHYDRAMINE HYDROCHLORIDE 50 MG: 50 INJECTION INTRAMUSCULAR; INTRAVENOUS at 04:16

## 2023-10-07 RX ADMIN — IPRATROPIUM BROMIDE AND ALBUTEROL SULFATE 3 ML: 2.5; .5 SOLUTION RESPIRATORY (INHALATION) at 04:23

## 2023-10-07 NOTE — ED PROVIDER NOTES
Subjective   History of Present Illness  48 year old very pleasant female presents to the emergency department accompanied by her mother with concerns about shortness of breath. She states she has had these symptoms for the past few days but states they are worse at night. She was discharged yesterday from our hospital (hospitalized 10/4-10/6). She states that during her hospitalization, she was getting frequent nebulizer treatments but wasn't discharged with an inhaler or nebulizer. 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 She was seen by infectious disease and discharged home on Xarelto which she was been taking as prescribed. She smoked cigarettes but states she is quitting and hasn't started back up smoking since she was in the hospital. She denies recent chest pain, but does have some burning pain and itching with rash around her umbilical area. She denies rash elsewhere. She has associated coughing spells but denies recent chest pain.       Review of Systems   Constitutional:  Negative for diaphoresis and fever.   HENT:  Negative for facial swelling.    Eyes:  Negative for photophobia and discharge.   Respiratory:  Positive for cough and shortness of breath.    Cardiovascular:  Negative for chest pain and leg swelling.   Genitourinary:         LMP 9/29/23   Skin:  Positive for rash.   Neurological:  Negative for facial asymmetry and speech difficulty.       Past Medical History:   Diagnosis Date    Hypertension    Bronchitis, pulmonic valve mass    Allergies   Allergen Reactions    Nuts Shortness Of Breath    Penicillins Shortness Of Breath and Dizziness     PEN-FAST = 3+, high risk for true allergy       No past surgical history on file.    No family history on file.    Social History     Socioeconomic History    Marital status: Single   Tobacco Use    Smoking status: Every Day     Packs/day: 1.00     Years: 30.00     Additional  pack years: 0.00     Total pack years: 30.00     Types: Cigarettes    Smokeless tobacco: Never   Substance and Sexual Activity    Alcohol use: Yes     Alcohol/week: 10.0 standard drinks of alcohol     Types: 5 Cans of beer, 5 Shots of liquor per week    Drug use: Never    Sexual activity: Defer           Objective   Physical Exam  Vitals and nursing note reviewed.   Constitutional:       Appearance: She is not diaphoretic.      Comments: Mild tachypnea, but speaks mostly in full sentences. Coughs during exam. BMI 32.   Cardiovascular:      Rate and Rhythm: Normal rate and regular rhythm.      Heart sounds: No murmur heard.     No friction rub. No gallop.      Comments: S1, S2, not tachycardic on my exam.  Pulmonary:      Breath sounds: Stridor present. Decreased breath sounds present. No wheezing, rhonchi or rales.      Comments: Coughs during exam. Decreased air entry throughout, mild tachypnea but able to speak in full sentences mostly. Scant wheezing on expiration, no prolonged expiratory phase.  Abdominal:      Palpations: Abdomen is soft.      Tenderness: There is no abdominal tenderness.   Musculoskeletal:      Comments: No significant peripheral edema. No calf tenderness bilaterally.   Skin:     General: Skin is warm and dry.      Comments: Blanching erythematous slightly raised rash to inferior periumbilical area consistent with urticarial rash.   Neurological:      Mental Status: She is alert.      Comments: Normal speech, no dysarthria, no facial droop.                    ED Course  ED Course as of 10/07/23 0554   Sat Oct 07, 2023   0356 Has hives urticaria to periumbilical area, tachypnea, decreased air entry throughout.  No rales or wheezing is appreciated.  Coughs during exam.  Had recent respiratory panel that was negative per patient. [LD]   0550 Is feeling better.  Results and plan discussed with patient and her mother at bedside, questions addressed. [LD]   0551 States she has had some respiratory  panel done recently and declines a repeat respiratory panel. [LD]      ED Course User Index  [LD] Edelmira Jimenez MD                                           Medical Decision Making  Differential diagnosis includes COPD, bronchitis. Less likely pneumonia, pneumothorax, pleural effusion, interstitial lung disease, pulmonary edema, and others. She is anticoagulated making pulmonary embolus less likely, and had CTA chest negative for PE on 10/4/23.    Problems Addressed:  Rash: complicated acute illness or injury  Shortness of breath: complicated acute illness or injury    Amount and/or Complexity of Data Reviewed  Independent Historian: parent     Details: Mother at bedside  External Data Reviewed: radiology and notes.     Details: CTA 10/4/23 reviewed. Discharge summary from 10/6/23 from our facility reviewed. History and physical from 10/4/23 reviewed.   Labs: ordered. Decision-making details documented in ED Course.  Radiology: ordered. Decision-making details documented in ED Course.  ECG/medicine tests: ordered. Decision-making details documented in ED Course.     Details: EKG at 0325 shows normal sinus rhythm at a rate of 92 beats per minute, normal intervals, no acute ischemic changes.    Risk  Prescription drug management.      Recent Results (from the past 24 hour(s))   Adult Transesophageal Echo 3D (REILLY) W/ Cont If Necessary Per Protocol    Collection Time: 10/06/23 12:09 PM   Result Value Ref Range    Echo EF Estimated 65.0 %   Comprehensive Metabolic Panel    Collection Time: 10/07/23  2:54 AM    Specimen: Blood   Result Value Ref Range    Glucose 119 (H) 65 - 99 mg/dL    BUN 8 6 - 20 mg/dL    Creatinine 0.62 0.57 - 1.00 mg/dL    Sodium 140 136 - 145 mmol/L    Potassium 3.6 3.5 - 5.2 mmol/L    Chloride 105 98 - 107 mmol/L    CO2 26.0 22.0 - 29.0 mmol/L    Calcium 9.1 8.6 - 10.5 mg/dL    Total Protein 6.6 6.0 - 8.5 g/dL    Albumin 3.6 3.5 - 5.2 g/dL    ALT (SGPT) 17 1 - 33 U/L    AST (SGOT) 23 1 - 32 U/L     Alkaline Phosphatase 176 (H) 39 - 117 U/L    Total Bilirubin 0.2 0.0 - 1.2 mg/dL    Globulin 3.0 gm/dL    A/G Ratio 1.2 g/dL    BUN/Creatinine Ratio 12.9 7.0 - 25.0    Anion Gap 9.0 5.0 - 15.0 mmol/L    eGFR 111.4 >60.0 mL/min/1.73   BNP    Collection Time: 10/07/23  2:54 AM    Specimen: Blood   Result Value Ref Range    proBNP 300.7 0.0 - 450.0 pg/mL   Single High Sensitivity Troponin T    Collection Time: 10/07/23  2:54 AM    Specimen: Blood   Result Value Ref Range    HS Troponin T 17 (H) <10 ng/L   Green Top (Gel)    Collection Time: 10/07/23  2:54 AM   Result Value Ref Range    Extra Tube Hold for add-ons.    Lavender Top    Collection Time: 10/07/23  2:54 AM   Result Value Ref Range    Extra Tube hold for add-on    Gold Top - SST    Collection Time: 10/07/23  2:54 AM   Result Value Ref Range    Extra Tube Hold for add-ons.    Light Blue Top    Collection Time: 10/07/23  2:54 AM   Result Value Ref Range    Extra Tube Hold for add-ons.    CBC Auto Differential    Collection Time: 10/07/23  2:54 AM    Specimen: Blood   Result Value Ref Range    WBC 10.41 3.40 - 10.80 10*3/mm3    RBC 4.01 3.77 - 5.28 10*6/mm3    Hemoglobin 12.5 12.0 - 15.9 g/dL    Hematocrit 38.7 34.0 - 46.6 %    MCV 96.5 79.0 - 97.0 fL    MCH 31.2 26.6 - 33.0 pg    MCHC 32.3 31.5 - 35.7 g/dL    RDW 13.2 12.3 - 15.4 %    RDW-SD 47.6 37.0 - 54.0 fl    MPV 10.1 6.0 - 12.0 fL    Platelets 259 140 - 450 10*3/mm3    Neutrophil % 68.6 42.7 - 76.0 %    Lymphocyte % 17.5 (L) 19.6 - 45.3 %    Monocyte % 3.6 (L) 5.0 - 12.0 %    Eosinophil % 9.6 (H) 0.3 - 6.2 %    Basophil % 0.1 0.0 - 1.5 %    Immature Grans % 0.6 (H) 0.0 - 0.5 %    Neutrophils, Absolute 7.15 (H) 1.70 - 7.00 10*3/mm3    Lymphocytes, Absolute 1.82 0.70 - 3.10 10*3/mm3    Monocytes, Absolute 0.37 0.10 - 0.90 10*3/mm3    Eosinophils, Absolute 1.00 (H) 0.00 - 0.40 10*3/mm3    Basophils, Absolute 0.01 0.00 - 0.20 10*3/mm3    Immature Grans, Absolute 0.06 (H) 0.00 - 0.05 10*3/mm3    nRBC 0.0  0.0 - 0.2 /100 WBC   ECG 12 Lead ED Triage Standing Order; SOA    Collection Time: 10/07/23  3:25 AM   Result Value Ref Range    QT Interval 368 ms    QTC Interval 455 ms   Single High Sensitivity Troponin T    Collection Time: 10/07/23  5:12 AM    Specimen: Blood   Result Value Ref Range    HS Troponin T 17 (H) <10 ng/L     Note: In addition to lab results from this visit, the labs listed above may include labs taken at another facility or during a different encounter within the last 24 hours. Please correlate lab times with ED admission and discharge times for further clarification of the services performed during this visit.    XR Chest 1 View   Final Result   Impression:   No acute cardiopulmonary abnormality.            Electronically Signed: Jasvir Robertson MD     10/7/2023 3:36 AM EDT     Workstation ID: [id number]        Vitals:    10/07/23 0423 10/07/23 0430 10/07/23 0500 10/07/23 0530   BP:  (!) 148/103 145/87 (!) 146/106   BP Location:       Patient Position:       Pulse: 103 99 96 112   Resp: 21      Temp:       TempSrc:       SpO2: 94% 99% 92% 93%   Weight:       Height:         Medications   sodium chloride 0.9 % flush 10 mL (has no administration in time range)   ipratropium-albuterol (DUO-NEB) nebulizer solution 3 mL (3 mL Nebulization Given 10/7/23 0423)   methylPREDNISolone sodium succinate (SOLU-Medrol) injection 125 mg (125 mg Intravenous Given 10/7/23 0416)   diphenhydrAMINE (BENADRYL) injection 50 mg (50 mg Intravenous Given 10/7/23 0416)   famotidine (PEPCID) injection 20 mg (20 mg Intravenous Given 10/7/23 0417)     ECG/EMG Results (last 24 hours)       Procedure Component Value Units Date/Time    ECG 12 Lead ED Triage Standing Order; SOA [[id number]] Collected: 10/07/23 0325     Updated: 10/07/23 0325     QT Interval 368 ms      QTC Interval 455 ms     Narrative:      Test Reason : ED Triage Standing Order~  Blood Pressure :   */*   mmHG  Vent. Rate :  92 BPM     Atrial Rate :  92 BPM     P-R  Int : 148 ms          QRS Dur :  82 ms      QT Int : 368 ms       P-R-T Axes :  74  72  66 degrees     QTc Int : 455 ms    Normal sinus rhythm  Normal ECG  When compared with ECG of 04-OCT-2023 03:43,  No significant change was found    Referred By: EDMD           Confirmed By:           ECG 12 Lead ED Triage Standing Order; SOA   Preliminary Result   Test Reason : ED Triage Standing Order~   Blood Pressure :   */*   mmHG   Vent. Rate :  92 BPM     Atrial Rate :  92 BPM      P-R Int : 148 ms          QRS Dur :  82 ms       QT Int : 368 ms       P-R-T Axes :  74  72  66 degrees      QTc Int : 455 ms      Normal sinus rhythm   Normal ECG   When compared with ECG of 04-OCT-2023 03:43,   No significant change was found      Referred By: EDMD           Confirmed By:               Final diagnoses:   Shortness of breath   Rash       ED Disposition  ED Disposition       ED Disposition   Discharge    Condition   Stable    Comment   --               April Sutherland MD  [address] [place] [id number] [phone]    Schedule an appointment as soon as possible for a visit in 1 week  primary care provider    Margarito Fox MD  [address] [place] [id number] [phone]    Schedule an appointment as soon as possible for a visit in 1 week  This is a pulmonologist/lung specialist or you can follow up with your pulmonologist/lung specialist of choice.         Medication List        New Prescriptions      * albuterol sulfate  (90 Base) MCG/ACT inhaler  Commonly known as: PROVENTIL HFA;VENTOLIN HFA;PROAIR HFA  Inhale 2 puffs Every 4 (Four) Hours As Needed for Wheezing or Shortness of Air.     * albuterol (2.5 MG/3ML) 0.083% nebulizer solution  Commonly known as: PROVENTIL  Take 2.5 mg by nebulization Every 4 (Four) Hours As Needed for Wheezing or Shortness of Air.     predniSONE 10 MG tablet  Commonly known as: DELTASONE  6 tab PO daily for 3 days, then 5 tab PO daily for 3 days, then 4 tab PO  daily for 3 days, then 3 tab PO daily for 3 days, then 2 tab PO daily x 3 days, then 1 tab PO daily for 3 days, then stop.           * This list has 2 medication(s) that are the same as other medications prescribed for you. Read the directions carefully, and ask your doctor or other care provider to review them with you.                   Where to Get Your Medications        These medications were sent to [address] [phone]  - [phone] [address] [place], [place] [id number]      Phone: [phone]   albuterol (2.5 MG/3ML) 0.083% nebulizer solution  albuterol sulfate  (90 Base) MCG/ACT inhaler  predniSONE 10 MG tablet            Edelmira Jimenez MD  10/07/23 0497

## 2023-10-07 NOTE — DISCHARGE INSTRUCTIONS
You can take benadryl over the counter as needed for itching/rash/hives as directed on package. Continue your other medications as prescribed including blood thinner.

## 2023-10-08 LAB
QT INTERVAL: 368 MS
QTC INTERVAL: 455 MS

## 2023-10-09 LAB
ANA SER QL: NEGATIVE
BACTERIA SPEC AEROBE CULT: NORMAL
BACTERIA SPEC AEROBE CULT: NORMAL

## 2023-10-10 LAB
APTT HEX PL PPP: 6 SEC (ref 0–11)
APTT PPP: 29.3 SEC (ref 22.9–30.2)
B2 GLYCOPROT1 IGG SER-ACNC: <9 GPI IGG UNITS (ref 0–20)
B2 GLYCOPROT1 IGM SER-ACNC: <9 GPI IGM UNITS (ref 0–32)
CARDIOLIPIN IGG SER IA-ACNC: <9 GPL U/ML (ref 0–14)
CARDIOLIPIN IGM SER IA-ACNC: <9 MPL U/ML (ref 0–12)
INR PPP: 1 (ref 0.9–1.2)
PATH INTERP BLD-IMP: NORMAL
PATH INTERP SPEC-IMP: NORMAL
PROTHROMBIN TIME: 10.2 SEC (ref 9.1–12)
SCREEN DRVVT: 42.2 SEC (ref 0–47)
THROMBIN TIME: 14.8 SEC (ref 0–23)

## 2023-10-13 ENCOUNTER — TELEPHONE (OUTPATIENT)
Dept: CARDIOLOGY | Facility: CLINIC | Age: 48
End: 2023-10-13
Payer: COMMERCIAL

## 2023-10-13 DIAGNOSIS — I82.90 THROMBUS: ICD-10-CM

## 2023-10-13 DIAGNOSIS — R93.89 ABNORMAL CT SCAN, CHEST: Primary | ICD-10-CM

## 2023-10-13 NOTE — TELEPHONE ENCOUNTER
Caller: Lizette Gagnon    Relationship to patient: Self    Best call back number: [phone]    New or established patient?  [] New  [x] Established    Date of discharge: 10.6.23     Facility discharged from: [facility]     Diagnosis/Symptoms: SOB, ABNORMAL CT SCAN, BRONCHITIS     Length of stay (If applicable): 2 DAYS     Specialty Only: Did you see a [facility] provider?    [x] Yes  [] No  If so, who?   - DISCHARGE NOTES INSTRUCT TO FOLLOW UP IN A MONTH. NO AVAILABILITY WITHIN THAT TIMEFRAME. PLEASE REACH OUT TO SCHEDULE PATIENT.

## 2023-10-13 NOTE — TELEPHONE ENCOUNTER
Spoke with Dr. Arreola. He wants patient to have repeat REILLY in 2 months and follow up afterwards.     Order placed for REILLY to be completed in 2 months. Patient to be scheduled for follow up appointment in 2-3 months after REILLY is completed.     Discussed with patient and patient agreeable to plan.

## 2023-10-17 ENCOUNTER — TELEPHONE (OUTPATIENT)
Dept: CARDIOLOGY | Facility: CLINIC | Age: 48
End: 2023-10-17
Payer: COMMERCIAL

## 2023-10-17 NOTE — TELEPHONE ENCOUNTER
Per JAL, Have her stop taking it for 2 days and change to apixaban 5mg BID       Advised patient on recommendations. Sent in new prescription. No further questions at this time.

## 2023-10-17 NOTE — TELEPHONE ENCOUNTER
Patient called and left message regarding reaction to Xarelto.       Called patient back. Patient has swelled bottom lip, sore throat, itchiness in armpits and genitals that is gradually worsening. Patient states she is living off of benadryl and it is getting where it is not helping. Started a day or so after Xarelto and REILLY.        Advised patient that if symptoms worsen to go to ER/UTC. She stated she really did not want to go to ER.      Please Advise.

## 2023-10-18 NOTE — TELEPHONE ENCOUNTER
Patient called today regarding medication. She states that she would prefer to take aspirin instead. Advised patient aspirin and Eliquis work different ways and Dr. Arreola recommends taking the Eliquis due to having an reaction to Xarelto.     Patient agreeable to plan.     Can give samples 1 month  LOT [id number]  EXP 07/2025

## 2023-10-19 ENCOUNTER — HOSPITAL ENCOUNTER (EMERGENCY)
Facility: HOSPITAL | Age: 48
Discharge: HOME | End: 2023-10-19
Attending: STUDENT IN AN ORGANIZED HEALTH CARE EDUCATION/TRAINING PROGRAM
Payer: COMMERCIAL

## 2023-10-19 VITALS
RESPIRATION RATE: 18 BRPM | HEIGHT: 66 IN | HEART RATE: 78 BPM | OXYGEN SATURATION: 96 % | SYSTOLIC BLOOD PRESSURE: 132 MMHG | DIASTOLIC BLOOD PRESSURE: 81 MMHG | BODY MASS INDEX: 34.55 KG/M2 | WEIGHT: 215 LBS | TEMPERATURE: 98.4 F

## 2023-10-19 DIAGNOSIS — L50.9 URTICARIA: ICD-10-CM

## 2023-10-19 DIAGNOSIS — L29.9 PRURITUS: ICD-10-CM

## 2023-10-19 DIAGNOSIS — T78.40XA ALLERGIC REACTION, INITIAL ENCOUNTER: Primary | ICD-10-CM

## 2023-10-19 LAB
ALBUMIN SERPL-MCNC: 3.7 G/DL (ref 3.5–5.2)
ALBUMIN/GLOB SERPL: 1.4 G/DL
ALP SERPL-CCNC: 106 U/L (ref 39–117)
ALT SERPL W P-5'-P-CCNC: 19 U/L (ref 1–33)
ANION GAP SERPL CALCULATED.3IONS-SCNC: 9 MMOL/L (ref 5–15)
AST SERPL-CCNC: 12 U/L (ref 1–32)
BASOPHILS # BLD AUTO: 0.03 10*3/MM3 (ref 0–0.2)
BASOPHILS NFR BLD AUTO: 0.2 % (ref 0–1.5)
BILIRUB SERPL-MCNC: 0.3 MG/DL (ref 0–1.2)
BUN SERPL-MCNC: 20 MG/DL (ref 6–20)
BUN/CREAT SERPL: 31.3 (ref 7–25)
CALCIUM SPEC-SCNC: 9.1 MG/DL (ref 8.6–10.5)
CHLORIDE SERPL-SCNC: 104 MMOL/L (ref 98–107)
CO2 SERPL-SCNC: 25 MMOL/L (ref 22–29)
CREAT SERPL-MCNC: 0.64 MG/DL (ref 0.57–1)
DEPRECATED RDW RBC AUTO: 46.7 FL (ref 37–54)
EGFRCR SERPLBLD CKD-EPI 2021: 110.5 ML/MIN/1.73
EOSINOPHIL # BLD AUTO: 0.24 10*3/MM3 (ref 0–0.4)
EOSINOPHIL NFR BLD AUTO: 1.5 % (ref 0.3–6.2)
ERYTHROCYTE [DISTWIDTH] IN BLOOD BY AUTOMATED COUNT: 13.7 % (ref 12.3–15.4)
GLOBULIN UR ELPH-MCNC: 2.7 GM/DL
GLUCOSE SERPL-MCNC: 112 MG/DL (ref 65–99)
HCT VFR BLD AUTO: 38.2 % (ref 34–46.6)
HGB BLD-MCNC: 12.3 G/DL (ref 12–15.9)
IMM GRANULOCYTES # BLD AUTO: 0.13 10*3/MM3 (ref 0–0.05)
IMM GRANULOCYTES NFR BLD AUTO: 0.8 % (ref 0–0.5)
LYMPHOCYTES # BLD AUTO: 1.58 10*3/MM3 (ref 0.7–3.1)
LYMPHOCYTES NFR BLD AUTO: 9.6 % (ref 19.6–45.3)
MAGNESIUM SERPL-MCNC: 1.9 MG/DL (ref 1.6–2.6)
MCH RBC QN AUTO: 30 PG (ref 26.6–33)
MCHC RBC AUTO-ENTMCNC: 32.2 G/DL (ref 31.5–35.7)
MCV RBC AUTO: 93.2 FL (ref 79–97)
MONOCYTES # BLD AUTO: 0.5 10*3/MM3 (ref 0.1–0.9)
MONOCYTES NFR BLD AUTO: 3 % (ref 5–12)
NEUTROPHILS NFR BLD AUTO: 13.93 10*3/MM3 (ref 1.7–7)
NEUTROPHILS NFR BLD AUTO: 84.9 % (ref 42.7–76)
NRBC BLD AUTO-RTO: 0 /100 WBC (ref 0–0.2)
PLATELET # BLD AUTO: 389 10*3/MM3 (ref 140–450)
PMV BLD AUTO: 9.7 FL (ref 6–12)
POTASSIUM SERPL-SCNC: 4 MMOL/L (ref 3.5–5.2)
PROT SERPL-MCNC: 6.4 G/DL (ref 6–8.5)
RBC # BLD AUTO: 4.1 10*6/MM3 (ref 3.77–5.28)
SODIUM SERPL-SCNC: 138 MMOL/L (ref 136–145)
TSH SERPL DL<=0.05 MIU/L-ACNC: 1.28 UIU/ML (ref 0.27–4.2)
WBC NRBC COR # BLD: 16.41 10*3/MM3 (ref 3.4–10.8)

## 2023-10-19 PROCEDURE — 25810000003 SODIUM CHLORIDE 0.9 % SOLUTION: Performed by: STUDENT IN AN ORGANIZED HEALTH CARE EDUCATION/TRAINING PROGRAM

## 2023-10-19 PROCEDURE — 96375 TX/PRO/DX INJ NEW DRUG ADDON: CPT

## 2023-10-19 PROCEDURE — 83735 ASSAY OF MAGNESIUM: CPT | Performed by: STUDENT IN AN ORGANIZED HEALTH CARE EDUCATION/TRAINING PROGRAM

## 2023-10-19 PROCEDURE — 25010000002 DEXAMETHASONE SODIUM PHOSPHATE 100 MG/10ML SOLUTION: Performed by: STUDENT IN AN ORGANIZED HEALTH CARE EDUCATION/TRAINING PROGRAM

## 2023-10-19 PROCEDURE — 96361 HYDRATE IV INFUSION ADD-ON: CPT

## 2023-10-19 PROCEDURE — 25010000002 DIPHENHYDRAMINE PER 50 MG: Performed by: STUDENT IN AN ORGANIZED HEALTH CARE EDUCATION/TRAINING PROGRAM

## 2023-10-19 PROCEDURE — 80050 GENERAL HEALTH PANEL: CPT | Performed by: STUDENT IN AN ORGANIZED HEALTH CARE EDUCATION/TRAINING PROGRAM

## 2023-10-19 PROCEDURE — 96374 THER/PROPH/DIAG INJ IV PUSH: CPT

## 2023-10-19 PROCEDURE — 99283 EMERGENCY DEPT VISIT LOW MDM: CPT

## 2023-10-19 RX ORDER — EPINEPHRINE 0.3 MG/.3ML
0.3 INJECTION SUBCUTANEOUS ONCE
Qty: 1 EACH | Refills: 0 | Status: SHIPPED | OUTPATIENT
Start: 2023-10-19 | End: 2023-10-19

## 2023-10-19 RX ORDER — FAMOTIDINE 20 MG/1
20 TABLET, FILM COATED ORAL ONCE
Status: COMPLETED | OUTPATIENT
Start: 2023-10-19 | End: 2023-10-19

## 2023-10-19 RX ORDER — DIPHENHYDRAMINE HYDROCHLORIDE 50 MG/ML
25 INJECTION INTRAMUSCULAR; INTRAVENOUS ONCE
Status: COMPLETED | OUTPATIENT
Start: 2023-10-19 | End: 2023-10-19

## 2023-10-19 RX ADMIN — DIPHENHYDRAMINE HYDROCHLORIDE 25 MG: 50 INJECTION INTRAMUSCULAR; INTRAVENOUS at 08:25

## 2023-10-19 RX ADMIN — FAMOTIDINE 20 MG: 20 TABLET, FILM COATED ORAL at 08:26

## 2023-10-19 RX ADMIN — DEXAMETHASONE SODIUM PHOSPHATE 10 MG: 10 INJECTION, SOLUTION INTRAMUSCULAR; INTRAVENOUS at 08:26

## 2023-10-19 RX ADMIN — SODIUM CHLORIDE 1000 ML: 9 INJECTION, SOLUTION INTRAVENOUS at 08:23

## 2023-10-19 NOTE — DISCHARGE INSTRUCTIONS
Continue treating symptoms with antihistamines but if any point you develop any difficulty breathing use the provided EpiPen and return to the ED right away.  Follow-up with your PCP and may benefit from referral for allergy testing as well.

## 2023-10-19 NOTE — ED PROVIDER NOTES
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  She was also started on prednisone during her last hospitalization.  She says she has been getting diffuse hives and urticarial migratory rash.  She also has the sensation that her lips and face are swelling.  Denies throat swelling or shortness of breath.  Has been taking Benadryl which has been helping.  No other complaints at this time.       PAST MEDICAL HISTORY  Past Medical History:   Diagnosis Date    Hypertension          PAST SURGICAL HISTORY  History reviewed. No pertinent surgical history.      FAMILY HISTORY  History reviewed. No pertinent family history.      SOCIAL HISTORY  Social History     Socioeconomic History    Marital status: Single   Tobacco Use    Smoking status: Every Day     Packs/day: 1.00     Years: 30.00     Additional pack years: 0.00     Total pack years: 30.00     Types: Cigarettes    Smokeless tobacco: Never   Substance and Sexual Activity    Alcohol use: Yes     Alcohol/week: 10.0 standard drinks of alcohol     Types: 5 Cans of beer, 5 Shots of liquor per week    Drug use: Never    Sexual activity: Defer         ALLERGIES  Nuts and Penicillins        REVIEW OF SYSTEMS  Review of Systems       All systems reviewed and negative except for those discussed in HPI.       PHYSICAL EXAM    I have reviewed the triage vital signs and nursing notes.    ED Triage Vitals [10/19/23 0656]   Temp Heart Rate Resp BP SpO2   98.4 °F (36.9 °C) 83 18  140/99 100 %      Temp src Heart Rate Source Patient Position BP Location FiO2 (%)   Oral Monitor Sitting Left arm --       Physical Exam  GENERAL:   Appears in no acute distress.   HENT: Nares patent.  No appreciated lip, tongue, facial swelling, soft underneath the tongue,  EYES: No scleral icterus.  CV: Regular rhythm, regular rate.  RESPIRATORY: Normal effort.  No audible wheezes, rales or rhonchi.  ABDOMEN: Soft, nontender  MUSCULOSKELETAL: No deformities.   NEURO: Alert, moves all extremities, follows commands.  SKIN: Warm, dry, mild, red, papular, urticarial type rash that patient reports is been intermittent and migratory      LAB RESULTS  Recent Results (from the past 24 hour(s))   Comprehensive Metabolic Panel    Collection Time: 10/19/23  8:18 AM    Specimen: Blood   Result Value Ref Range    Glucose 112 (H) 65 - 99 mg/dL    BUN 20 6 - 20 mg/dL    Creatinine 0.64 0.57 - 1.00 mg/dL    Sodium 138 136 - 145 mmol/L    Potassium 4.0 3.5 - 5.2 mmol/L    Chloride 104 98 - 107 mmol/L    CO2 25.0 22.0 - 29.0 mmol/L    Calcium 9.1 8.6 - 10.5 mg/dL    Total Protein 6.4 6.0 - 8.5 g/dL    Albumin 3.7 3.5 - 5.2 g/dL    ALT (SGPT) 19 1 - 33 U/L    AST (SGOT) 12 1 - 32 U/L    Alkaline Phosphatase 106 39 - 117 U/L    Total Bilirubin 0.3 0.0 - 1.2 mg/dL    Globulin 2.7 gm/dL    A/G Ratio 1.4 g/dL    BUN/Creatinine Ratio 31.3 (H) 7.0 - 25.0    Anion Gap 9.0 5.0 - 15.0 mmol/L    eGFR 110.5 >60.0 mL/min/1.73   Magnesium    Collection Time: 10/19/23  8:18 AM    Specimen: Blood   Result Value Ref Range    Magnesium 1.9 1.6 - 2.6 mg/dL   TSH    Collection Time: 10/19/23  8:18 AM    Specimen: Blood   Result Value Ref Range    TSH 1.280 0.270 - 4.200 uIU/mL   CBC Auto Differential    Collection Time: 10/19/23  8:18 AM    Specimen: Blood   Result Value Ref Range    WBC 16.41 (H) 3.40 - 10.80 10*3/mm3    RBC 4.10 3.77 - 5.28 10*6/mm3    Hemoglobin 12.3 12.0 - 15.9 g/dL    Hematocrit 38.2 34.0 - 46.6 %    MCV 93.2 79.0 - 97.0 fL     MCH 30.0 26.6 - 33.0 pg    MCHC 32.2 31.5 - 35.7 g/dL    RDW 13.7 12.3 - 15.4 %    RDW-SD 46.7 37.0 - 54.0 fl    MPV 9.7 6.0 - 12.0 fL    Platelets 389 140 - 450 10*3/mm3    Neutrophil % 84.9 (H) 42.7 - 76.0 %    Lymphocyte % 9.6 (L) 19.6 - 45.3 %    Monocyte % 3.0 (L) 5.0 - 12.0 %    Eosinophil % 1.5 0.3 - 6.2 %    Basophil % 0.2 0.0 - 1.5 %    Immature Grans % 0.8 (H) 0.0 - 0.5 %    Neutrophils, Absolute 13.93 (H) 1.70 - 7.00 10*3/mm3    Lymphocytes, Absolute 1.58 0.70 - 3.10 10*3/mm3    Monocytes, Absolute 0.50 0.10 - 0.90 10*3/mm3    Eosinophils, Absolute 0.24 0.00 - 0.40 10*3/mm3    Basophils, Absolute 0.03 0.00 - 0.20 10*3/mm3    Immature Grans, Absolute 0.13 (H) 0.00 - 0.05 10*3/mm3    nRBC 0.0 0.0 - 0.2 /100 WBC       If labs were ordered, I independently reviewed the results and considered them in treating the patient.        RADIOLOGY  No Radiology Exams Resulted Within Past 24 Hours    I ordered and independently reviewed the above noted radiographic studies.        See radiologist's dictation for official interpretation.        PROCEDURES    Procedures    No orders to display       MEDICATIONS GIVEN IN ER    Medications   sodium chloride 0.9 % bolus 1,000 mL (0 mL Intravenous Stopped 10/19/23 0955)   diphenhydrAMINE (BENADRYL) injection 25 mg (25 mg Intravenous Given 10/19/23 0825)   famotidine (PEPCID) tablet 20 mg (20 mg Oral Given 10/19/23 0826)   dexAMETHasone (DECADRON) IVPB 10 mg (0 mg Intravenous Stopped 10/19/23 0905)         MEDICAL DECISION MAKING, PROGRESS, and CONSULTS    All labs have been independently reviewed by me.  All radiology studies have been reviewed by me and the radiologist dictating the report.  All EKG's have been independently viewed and interpreted by me/my attending physician.      Discussion below represents my analysis of pertinent findings related to patient's condition, differential diagnosis, treatment plan and final disposition.      Differential  diagnosis:    Anaphylaxis, angioedema, urticaria, allergic reaction, contact dermatitis, medication adverse reaction, sepsis, anemia, electrolyte abnormality      Additional sources:    - Discussed/ obtained information from independent historians:      - External (non-ED) record review: Recent hospital admission for pulmonary valve lesion, bronchitis, hypokalemia    - Chronic or social conditions impacting care: Hypertension, pulmonary valve bleeding on anticoagulation, history of tobacco abuse    - Shared decision making: Agreeable to discharge with symptomatic care and PCP follow-up      Orders placed during this visit:  Orders Placed This Encounter   Procedures    Comprehensive Metabolic Panel    Magnesium    TSH    CBC Auto Differential    CBC & Differential         Additional orders considered but not ordered:      ED Course:    Consultants:      ED Course as of 10/19/23 1436   Thu Oct 19, 2023   2519 Chart review of recent hospital admission for pulmonic valve lesion shows history of heavy smoking history, HTN who presented with 5 days of cough, sore throat, nasal stuffiness, subjective fever c/w viral UTI. Found to have concern for pulmonary valve lesion on CTA chest. 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  She was also started on prednisone during her last hospitalization.  She says she has been getting diffuse [CC]   0745  hives and urticarial migratory rash.  She also has the sensation that her lips and face are swelling.  Denies throat swelling or shortness of breath.  Has been taking Benadryl which has been helping.  No other complaints at this time. [CC]   0746 She arrived awake and alert vitals within normal limits she does have very mild urticarial rash over the anterior neck.  I do not appreciate  any lip tongue or oral swelling, soft underneath the tongue.  Obtaining basic laboratory work-up and treating her with IV fluids famotidine and another dose of diphenhydramine and 10 mg of dexamethasone she is on 10 mg of oral prednisone.  Will reevaluate pending initial work-up. [CC]   0922 Labs are reassuring and nonactionable she does have leukocytosis at 16.41 but in the setting of her systemic corticosteroids would be expected.  Metabolic panel reassuring and nonactionable.  Normal magnesium and TSH. [CC]   0938 Upon reevaluation she is saying she is feeling better after medications still no respiratory difficulty we have been able to defer epinephrine throughout her time in the ED.  Think she can safely discharge the dexamethasone she was given should continue to be some benefit and counseled to continue treatment with antihistamines.  Provided prescription for an EpiPen and counseled on strict return precautions.  We will follow-up with her PCP for possible referral for allergy testing.  Discharged in stable condition with strict return precautions. [CC]      ED Course User Index  [CC] Truong Gonzalez MD              Shared Decision Making:  After my consideration of clinical presentation and any laboratory/radiology studies obtained, I discussed the findings with the patient/patient representative who is in agreement with the treatment plan and the final disposition.   Risks and benefits of discharge and/or observation/admission were discussed.       AS OF 14:36 EDT VITALS:    BP - 132/81  HR - 78  TEMP - 98.4 °F (36.9 °C) (Oral)  O2 SATS - 96%                  DIAGNOSIS  Final diagnoses:   Allergic reaction, initial encounter   Urticaria   Pruritus         DISPOSITION  DISCHARGE    Patient discharged in stable condition.    Reviewed implications of results, diagnosis, meds, responsibility to follow up, warning signs and symptoms of possible worsening, potential complications and reasons to return to  ER.    Patient/Family voiced understanding of above instructions.    Discussed plan for discharge, as there is no emergent indication for admission.  Pt/family is agreeable and understands need for follow up and possible repeat testing.  Pt/family is aware that discharge does not mean that nothing is wrong but that it indicates no emergency is currently present that requires admission and they must continue care with follow-up as given below or with a physician of their choice.     FOLLOW-UP  April Sutherland MD  [address] [place], [place] [id number] [phone]    Call            Medication List        New Prescriptions      EPINEPHrine 0.3 MG/0.3ML solution auto-injector injection  Commonly known as: EPIPEN  Inject 0.3 mL under the skin into the appropriate area as directed 1 (One) Time for 1 dose.               Where to Get Your Medications        These medications were sent to [facility] [address] [place] [address] - [phone]  - [phone] [place] [id number]      Phone: [phone]   EPINEPHrine 0.3 MG/0.3ML solution auto-injector injection             Please note that portions of this document were completed with voice recognition software.        Truong Gonzalez MD  10/19/23 6024

## 2023-10-21 ENCOUNTER — APPOINTMENT (OUTPATIENT)
Dept: GENERAL RADIOLOGY | Facility: HOSPITAL | Age: 48
End: 2023-10-21
Payer: COMMERCIAL

## 2023-10-21 ENCOUNTER — HOSPITAL ENCOUNTER (EMERGENCY)
Facility: HOSPITAL | Age: 48
Discharge: HOME | End: 2023-10-21
Attending: STUDENT IN AN ORGANIZED HEALTH CARE EDUCATION/TRAINING PROGRAM
Payer: COMMERCIAL

## 2023-10-21 VITALS
HEART RATE: 81 BPM | TEMPERATURE: 98.1 F | WEIGHT: 215 LBS | BODY MASS INDEX: 34.55 KG/M2 | DIASTOLIC BLOOD PRESSURE: 70 MMHG | HEIGHT: 66 IN | SYSTOLIC BLOOD PRESSURE: 110 MMHG | OXYGEN SATURATION: 97 % | RESPIRATION RATE: 18 BRPM

## 2023-10-21 DIAGNOSIS — R46.89 AGGRESSIVE BEHAVIOR: ICD-10-CM

## 2023-10-21 DIAGNOSIS — L50.9 HIVES: Primary | ICD-10-CM

## 2023-10-21 DIAGNOSIS — R46.89 VERBALLY ABUSIVE BEHAVIOR: ICD-10-CM

## 2023-10-21 LAB
ALBUMIN SERPL-MCNC: 3.7 G/DL (ref 3.5–5.2)
ALBUMIN/GLOB SERPL: 1.3 G/DL
ALP SERPL-CCNC: 112 U/L (ref 39–117)
ALT SERPL W P-5'-P-CCNC: 18 U/L (ref 1–33)
ANION GAP SERPL CALCULATED.3IONS-SCNC: 9 MMOL/L (ref 5–15)
AST SERPL-CCNC: 15 U/L (ref 1–32)
B PARAPERT DNA SPEC QL NAA+PROBE: NOT DETECTED
B PERT DNA SPEC QL NAA+PROBE: NOT DETECTED
B-HCG UR QL: NEGATIVE
BACTERIA UR QL AUTO: ABNORMAL /HPF
BASOPHILS # BLD AUTO: 0.03 10*3/MM3 (ref 0–0.2)
BASOPHILS NFR BLD AUTO: 0.2 % (ref 0–1.5)
BILIRUB SERPL-MCNC: 0.2 MG/DL (ref 0–1.2)
BILIRUB UR QL STRIP: NEGATIVE
BUN SERPL-MCNC: 24 MG/DL (ref 6–20)
BUN/CREAT SERPL: 31.2 (ref 7–25)
C PNEUM DNA NPH QL NAA+NON-PROBE: NOT DETECTED
CALCIUM SPEC-SCNC: 8.8 MG/DL (ref 8.6–10.5)
CHLORIDE SERPL-SCNC: 104 MMOL/L (ref 98–107)
CLARITY UR: CLEAR
CO2 SERPL-SCNC: 25 MMOL/L (ref 22–29)
COLOR UR: ABNORMAL
CREAT SERPL-MCNC: 0.77 MG/DL (ref 0.57–1)
CRP SERPL-MCNC: 1.06 MG/DL (ref 0–0.5)
D-LACTATE SERPL-SCNC: 1.4 MMOL/L (ref 0.5–2)
DEPRECATED RDW RBC AUTO: 49.8 FL (ref 37–54)
EGFRCR SERPLBLD CKD-EPI 2021: 96.5 ML/MIN/1.73
EOSINOPHIL # BLD AUTO: 0.21 10*3/MM3 (ref 0–0.4)
EOSINOPHIL NFR BLD AUTO: 1.2 % (ref 0.3–6.2)
ERYTHROCYTE [DISTWIDTH] IN BLOOD BY AUTOMATED COUNT: 13.8 % (ref 12.3–15.4)
ERYTHROCYTE [SEDIMENTATION RATE] IN BLOOD: 41 MM/HR (ref 0–20)
EXPIRATION DATE: NORMAL
FLUAV SUBTYP SPEC NAA+PROBE: NOT DETECTED
FLUBV RNA ISLT QL NAA+PROBE: NOT DETECTED
GLOBULIN UR ELPH-MCNC: 2.9 GM/DL
GLUCOSE SERPL-MCNC: 99 MG/DL (ref 65–99)
GLUCOSE UR STRIP-MCNC: NEGATIVE MG/DL
HADV DNA SPEC NAA+PROBE: NOT DETECTED
HCOV 229E RNA SPEC QL NAA+PROBE: NOT DETECTED
HCOV HKU1 RNA SPEC QL NAA+PROBE: NOT DETECTED
HCOV NL63 RNA SPEC QL NAA+PROBE: NOT DETECTED
HCOV OC43 RNA SPEC QL NAA+PROBE: NOT DETECTED
HCT VFR BLD AUTO: 40.7 % (ref 34–46.6)
HGB BLD-MCNC: 12.9 G/DL (ref 12–15.9)
HGB UR QL STRIP.AUTO: ABNORMAL
HMPV RNA NPH QL NAA+NON-PROBE: NOT DETECTED
HPIV1 RNA ISLT QL NAA+PROBE: NOT DETECTED
HPIV2 RNA SPEC QL NAA+PROBE: NOT DETECTED
HPIV3 RNA NPH QL NAA+PROBE: NOT DETECTED
HPIV4 P GENE NPH QL NAA+PROBE: NOT DETECTED
HYALINE CASTS UR QL AUTO: ABNORMAL /LPF
IMM GRANULOCYTES # BLD AUTO: 0.14 10*3/MM3 (ref 0–0.05)
IMM GRANULOCYTES NFR BLD AUTO: 0.8 % (ref 0–0.5)
INTERNAL NEGATIVE CONTROL: NEGATIVE
INTERNAL POSITIVE CONTROL: POSITIVE
KETONES UR QL STRIP: NEGATIVE
LEUKOCYTE ESTERASE UR QL STRIP.AUTO: ABNORMAL
LYMPHOCYTES # BLD AUTO: 2.18 10*3/MM3 (ref 0.7–3.1)
LYMPHOCYTES NFR BLD AUTO: 12.8 % (ref 19.6–45.3)
Lab: NORMAL
M PNEUMO IGG SER IA-ACNC: NOT DETECTED
MCH RBC QN AUTO: 30.9 PG (ref 26.6–33)
MCHC RBC AUTO-ENTMCNC: 31.7 G/DL (ref 31.5–35.7)
MCV RBC AUTO: 97.4 FL (ref 79–97)
MONOCYTES # BLD AUTO: 0.46 10*3/MM3 (ref 0.1–0.9)
MONOCYTES NFR BLD AUTO: 2.7 % (ref 5–12)
NEUTROPHILS NFR BLD AUTO: 14.07 10*3/MM3 (ref 1.7–7)
NEUTROPHILS NFR BLD AUTO: 82.3 % (ref 42.7–76)
NITRITE UR QL STRIP: NEGATIVE
NRBC BLD AUTO-RTO: 0 /100 WBC (ref 0–0.2)
NT-PROBNP SERPL-MCNC: 147.9 PG/ML (ref 0–450)
PH UR STRIP.AUTO: 5.5 [PH] (ref 5–8)
PLATELET # BLD AUTO: 405 10*3/MM3 (ref 140–450)
PMV BLD AUTO: 9.9 FL (ref 6–12)
POTASSIUM SERPL-SCNC: 3.8 MMOL/L (ref 3.5–5.2)
PROCALCITONIN SERPL-MCNC: 0.03 NG/ML (ref 0–0.25)
PROT SERPL-MCNC: 6.6 G/DL (ref 6–8.5)
PROT UR QL STRIP: NEGATIVE
RBC # BLD AUTO: 4.18 10*6/MM3 (ref 3.77–5.28)
RBC # UR STRIP: ABNORMAL /HPF
REF LAB TEST METHOD: ABNORMAL
RHINOVIRUS RNA SPEC NAA+PROBE: NOT DETECTED
RSV RNA NPH QL NAA+NON-PROBE: NOT DETECTED
SARS-COV-2 RNA NPH QL NAA+NON-PROBE: NOT DETECTED
SODIUM SERPL-SCNC: 138 MMOL/L (ref 136–145)
SP GR UR STRIP: 1.02 (ref 1–1.03)
SQUAMOUS #/AREA URNS HPF: ABNORMAL /HPF
TROPONIN T SERPL HS-MCNC: <6 NG/L
UROBILINOGEN UR QL STRIP: ABNORMAL
WBC # UR STRIP: ABNORMAL /HPF
WBC NRBC COR # BLD: 17.09 10*3/MM3 (ref 3.4–10.8)

## 2023-10-21 PROCEDURE — 83880 ASSAY OF NATRIURETIC PEPTIDE: CPT | Performed by: NURSE PRACTITIONER

## 2023-10-21 PROCEDURE — 96372 THER/PROPH/DIAG INJ SC/IM: CPT

## 2023-10-21 PROCEDURE — 99284 EMERGENCY DEPT VISIT MOD MDM: CPT

## 2023-10-21 PROCEDURE — 71045 X-RAY EXAM CHEST 1 VIEW: CPT

## 2023-10-21 PROCEDURE — 83605 ASSAY OF LACTIC ACID: CPT | Performed by: NURSE PRACTITIONER

## 2023-10-21 PROCEDURE — 96375 TX/PRO/DX INJ NEW DRUG ADDON: CPT

## 2023-10-21 PROCEDURE — 85652 RBC SED RATE AUTOMATED: CPT | Performed by: NURSE PRACTITIONER

## 2023-10-21 PROCEDURE — 0202U NFCT DS 22 TRGT SARS-COV-2: CPT | Performed by: NURSE PRACTITIONER

## 2023-10-21 PROCEDURE — 81001 URINALYSIS AUTO W/SCOPE: CPT | Performed by: NURSE PRACTITIONER

## 2023-10-21 PROCEDURE — 25010000002 EPINEPHRINE (ANAPHYLAXIS) 1 MG/ML SOLUTION: Performed by: NURSE PRACTITIONER

## 2023-10-21 PROCEDURE — 85025 COMPLETE CBC W/AUTO DIFF WBC: CPT | Performed by: NURSE PRACTITIONER

## 2023-10-21 PROCEDURE — 96374 THER/PROPH/DIAG INJ IV PUSH: CPT

## 2023-10-21 PROCEDURE — 84484 ASSAY OF TROPONIN QUANT: CPT | Performed by: NURSE PRACTITIONER

## 2023-10-21 PROCEDURE — 81025 URINE PREGNANCY TEST: CPT | Performed by: NURSE PRACTITIONER

## 2023-10-21 PROCEDURE — 93005 ELECTROCARDIOGRAM TRACING: CPT | Performed by: NURSE PRACTITIONER

## 2023-10-21 PROCEDURE — 80053 COMPREHEN METABOLIC PANEL: CPT | Performed by: NURSE PRACTITIONER

## 2023-10-21 PROCEDURE — 86140 C-REACTIVE PROTEIN: CPT | Performed by: NURSE PRACTITIONER

## 2023-10-21 PROCEDURE — 84145 PROCALCITONIN (PCT): CPT | Performed by: NURSE PRACTITIONER

## 2023-10-21 PROCEDURE — 25010000002 DIPHENHYDRAMINE PER 50 MG: Performed by: NURSE PRACTITIONER

## 2023-10-21 RX ORDER — SODIUM CHLORIDE 0.9 % (FLUSH) 0.9 %
10 SYRINGE (ML) INJECTION AS NEEDED
Status: DISCONTINUED | OUTPATIENT
Start: 2023-10-21 | End: 2023-10-21 | Stop reason: HOSPADM

## 2023-10-21 RX ORDER — EPINEPHRINE 1 MG/ML
0.3 INJECTION, SOLUTION INTRAMUSCULAR; SUBCUTANEOUS ONCE
Status: COMPLETED | OUTPATIENT
Start: 2023-10-21 | End: 2023-10-21

## 2023-10-21 RX ORDER — DIPHENHYDRAMINE HYDROCHLORIDE 50 MG/ML
12.5 INJECTION INTRAMUSCULAR; INTRAVENOUS ONCE
Status: COMPLETED | OUTPATIENT
Start: 2023-10-21 | End: 2023-10-21

## 2023-10-21 RX ORDER — FAMOTIDINE 10 MG/ML
20 INJECTION, SOLUTION INTRAVENOUS ONCE
Status: COMPLETED | OUTPATIENT
Start: 2023-10-21 | End: 2023-10-21

## 2023-10-21 RX ADMIN — DIPHENHYDRAMINE HYDROCHLORIDE 12.5 MG: 50 INJECTION INTRAMUSCULAR; INTRAVENOUS at 08:25

## 2023-10-21 RX ADMIN — EPINEPHRINE 0.3 MG: 1 INJECTION INTRAMUSCULAR; INTRAVENOUS; SUBCUTANEOUS at 08:24

## 2023-10-21 RX ADMIN — FAMOTIDINE 20 MG: 10 INJECTION, SOLUTION INTRAVENOUS at 08:25

## 2023-10-21 NOTE — ED PROVIDER NOTES
Subjective   History of Present Illness  Pleasant patient who presents with hives on her abdomen along with feel like her throat is getting tight.  Multiple ER visits for similar.  Patient tells me earlier this month this all started with a bad cold where she was seen at [facility].  She was treated with doxycycline.  She reports that her coughing gotten worse and she came here which led to a CTA showing a mass on her pulmonic valve.  This was thought to be related to infection or clot.  Her blood cultures have been negative.  She was started on Xarelto.  She started developing hives because of this and she was taken off Xarelto and started on Eliquis.  She tells me she has not had her Eliquis since yesterday but yet her hives did return last night.  She tells me antihistamines does help but only a short time.  She was prescribed EpiPen but she has not used it.  She denies any other new medications.  She denies any fevers or chills.  She is still on steroids and finishing them up.    I did review her old records.  It looks like she was originally started on Xarelto.  Patient tells me that she was switched from Xarelto to Eliquis related to her hives.  However the records indicate that is more related to cost.  I will need to clarify this.        Review of Systems    Past Medical History:   Diagnosis Date    Hypertension        Allergies   Allergen Reactions    Nuts Shortness Of Breath    Penicillins Shortness Of Breath and Dizziness     PEN-FAST = 3+, high risk for true allergy       History reviewed. No pertinent surgical history.    History reviewed. No pertinent family history.    Social History     Socioeconomic History    Marital status: Single   Tobacco Use    Smoking status: Every Day     Packs/day: 1.00     Years: 30.00     Additional pack years: 0.00     Total pack years: 30.00     Types: Cigarettes    Smokeless tobacco: Never   Substance and Sexual Activity    Alcohol use: Yes     Alcohol/week: 10.0  standard drinks of alcohol     Types: 5 Cans of beer, 5 Shots of liquor per week    Drug use: Never    Sexual activity: Defer           Objective   Physical Exam  Constitutional:       Appearance: She is well-developed.   HENT:      Head: Normocephalic and atraumatic.      Right Ear: External ear normal.      Left Ear: External ear normal.      Nose: Nose normal.   Eyes:      Conjunctiva/sclera: Conjunctivae normal.      Pupils: Pupils are equal, round, and reactive to light.   Cardiovascular:      Rate and Rhythm: Normal rate and regular rhythm.      Heart sounds: Normal heart sounds.   Pulmonary:      Effort: Pulmonary effort is normal.      Breath sounds: Normal breath sounds.   Abdominal:      General: Bowel sounds are normal.      Palpations: Abdomen is soft.   Musculoskeletal:         General: Normal range of motion.      Cervical back: Normal range of motion and neck supple.   Skin:     General: Skin is warm and dry.      Findings: Rash (Hives noted on her abdomen) present.   Neurological:      Mental Status: She is alert and oriented to person, place, and time.   Psychiatric:         Behavior: Behavior normal.         Thought Content: Thought content normal.         Judgment: Judgment normal.         Procedures           ED Course  ED Course as of 10/21/23 1142   Sat Oct 21, 2023   0822 We will give her dose of epinephrine here as she tells me she does feel like her throat is getting tight.  This has been very annoying for the patient with recurrent highs frequent ER trips.  Reassuringly she has had negative blood cultures already.  She tells me her symptoms of the hives did start after her treatment for her mass in the heart valve.  Notes reviewed she was going to be anticoagulated for several months and then get repeat imaging most likely cardiac MRI.  We will get lab work here epinephrine antihistamines and continue to monitor. [JM]   1120 I went back in with the patient to discuss the findings.  Symptoms  "are resolved.  I did discuss with her a plan on the next steps as she has been to the ER several times.  She tells me she is off the Xarelto related to the reaction and not the cost.  I spoke with her that if she is feeling better she can continue her follow-up as an outpatient with her primary care cardiology and make a referral to an allergist.  Also discussed possible admission. [JM]   1122 She is not happy with my plan.  She also does not want to be admitted to the hospital.  I did  on some frustration and I sat down to discuss the patient about perhaps she should be admitted to the hospital given her continued symptoms and lack of answers. [JM]   1124 Was at that point the patient became very verbally abusive with aggressive behavior violently taken off her leads as well as her blood pressure cuff.  She advised me to get the \"fuck\" out of her room.  And she is \"fucking\" out of here.  And she is \"fucking\" tired of being nice. [JM]   1125 I am unsure what led to her violent and aggressive behavior.  I advised her that I would do anything I can to make her happy.  She continued to cussing and berating me and told me I was a \"fucking\" joke.  [JM]   1126  I asked her not to speak to me that way and that I will discharge her as she requests. [JM]   1128 Overall I am very confused on how her behavior became so verbally abusive and aggressive as I was calm during the entire evaluation and I even offered to do anything to make her happy but yet she still was very aggressive and abusive to the point where I became very uncomfortable with the patient.  Did advise the patient that her behavior is not acceptable.  She told me to get the \"fuck\" out of her room and that I was a \"fucking\" joke.  She advised me that I was being childish and that I was being disrespectful.  Which is confusing because she was the only one who was yelling and cursing and acting childish and disrespectful.  Nonetheless this patient is " completely unreasonable with aggressive behavior and not willing to accept any help I have to offer as I did advise her I would do anything to help her.  Nonetheless this was still not good enough for her. [ELIAS]   1136 Patient left without any of her paperwork. [JM]      ED Course User Index  [ELIAS] Garry Rogers APRN                                           Medical Decision Making  Problems Addressed:  Aggressive behavior: complicated acute illness or injury  Hives: complicated acute illness or injury  Verbally abusive behavior: complicated acute illness or injury    Amount and/or Complexity of Data Reviewed  External Data Reviewed: labs.  Labs: ordered.  Radiology: ordered.  ECG/medicine tests: ordered.    Risk  Prescription drug management.  Decision regarding hospitalization.        Final diagnoses:   Hives   Aggressive behavior   Verbally abusive behavior       ED Disposition  ED Disposition       ED Disposition   Discharge    Condition   Stable    Comment   --               No follow-up provider specified.       Medication List      No changes were made to your prescriptions during this visit.            Garry Rogers APRN  10/21/23 1140       Garry Rogers APRN  10/21/23 1142

## 2023-10-22 LAB
QT INTERVAL: 386 MS
QTC INTERVAL: 436 MS

## 2023-10-23 NOTE — TELEPHONE ENCOUNTER
"Patient called and stated she is having a severe allergic reaction to the Eliquis as well. Stated it is causing the same reaction as the Xarelto and she cannot keep taking it. She stated she has stopped taking all blood thinners due to the reaction and still wants to continue with the REILLY in January.   Patient symptoms are the same as last call on 10/18/2023.     \"Patient has swelled bottom lip, sore throat, itchiness in armpits and genitals that is gradually worsening. Patient states she is living off of benadryl and it is getting where it is not helping\"    Please Advise next steps.   "

## 2023-10-23 NOTE — TELEPHONE ENCOUNTER
"Per GEE, \"That seems reasonable for now. I'll look through her work-up and see if anything jumps out.\"      Discussed with patient recommendations and will follow up as necessary after Dr. Arreola reviews patients chart.     "

## 2023-12-14 ENCOUNTER — HOSPITAL ENCOUNTER (OUTPATIENT)
Dept: CARDIOLOGY | Facility: HOSPITAL | Age: 48
Discharge: HOME | End: 2023-12-14
Payer: COMMERCIAL

## 2023-12-14 VITALS
RESPIRATION RATE: 16 BRPM | HEART RATE: 68 BPM | SYSTOLIC BLOOD PRESSURE: 150 MMHG | OXYGEN SATURATION: 100 % | BODY MASS INDEX: 34.72 KG/M2 | DIASTOLIC BLOOD PRESSURE: 90 MMHG | HEIGHT: 66 IN

## 2023-12-14 DIAGNOSIS — I37.8 PULMONARY VALVE MASS: Primary | ICD-10-CM

## 2023-12-14 DIAGNOSIS — R93.89 ABNORMAL CT SCAN, CHEST: ICD-10-CM

## 2023-12-14 DIAGNOSIS — I82.90 THROMBUS: ICD-10-CM

## 2023-12-14 PROCEDURE — 25010000002 FENTANYL CITRATE (PF) 50 MCG/ML SOLUTION: Performed by: INTERNAL MEDICINE

## 2023-12-14 PROCEDURE — 76376 3D RENDER W/INTRP POSTPROCES: CPT

## 2023-12-14 PROCEDURE — 93325 DOPPLER ECHO COLOR FLOW MAPG: CPT

## 2023-12-14 PROCEDURE — 93321 DOPPLER ECHO F-UP/LMTD STD: CPT

## 2023-12-14 PROCEDURE — 25010000002 MIDAZOLAM PER 1 MG: Performed by: INTERNAL MEDICINE

## 2023-12-14 RX ORDER — FENTANYL CITRATE 50 UG/ML
INJECTION, SOLUTION INTRAMUSCULAR; INTRAVENOUS
Status: COMPLETED | OUTPATIENT
Start: 2023-12-14 | End: 2023-12-14

## 2023-12-14 RX ORDER — MIDAZOLAM HYDROCHLORIDE 1 MG/ML
INJECTION INTRAMUSCULAR; INTRAVENOUS
Status: COMPLETED | OUTPATIENT
Start: 2023-12-14 | End: 2023-12-14

## 2023-12-14 RX ADMIN — FENTANYL CITRATE 25 MCG: 50 INJECTION, SOLUTION INTRAMUSCULAR; INTRAVENOUS at 09:27

## 2023-12-14 RX ADMIN — MIDAZOLAM 1 MG: 1 INJECTION INTRAMUSCULAR; INTRAVENOUS at 09:33

## 2023-12-14 RX ADMIN — MIDAZOLAM 1 MG: 1 INJECTION INTRAMUSCULAR; INTRAVENOUS at 09:30

## 2023-12-14 RX ADMIN — FENTANYL CITRATE 50 MCG: 50 INJECTION, SOLUTION INTRAMUSCULAR; INTRAVENOUS at 09:21

## 2023-12-14 RX ADMIN — FENTANYL CITRATE 25 MCG: 50 INJECTION, SOLUTION INTRAMUSCULAR; INTRAVENOUS at 09:30

## 2023-12-14 RX ADMIN — METHOHEXITAL SODIUM 10 MG: 500 INJECTION, POWDER, LYOPHILIZED, FOR SOLUTION INTRAMUSCULAR; INTRAVENOUS; RECTAL at 09:37

## 2023-12-14 RX ADMIN — FENTANYL CITRATE 25 MCG: 50 INJECTION, SOLUTION INTRAMUSCULAR; INTRAVENOUS at 09:33

## 2023-12-14 RX ADMIN — MIDAZOLAM 2 MG: 1 INJECTION INTRAMUSCULAR; INTRAVENOUS at 09:21

## 2023-12-14 RX ADMIN — MIDAZOLAM 1 MG: 1 INJECTION INTRAMUSCULAR; INTRAVENOUS at 09:27

## 2023-12-14 RX ADMIN — METHOHEXITAL SODIUM 10 MG: 500 INJECTION, POWDER, LYOPHILIZED, FOR SOLUTION INTRAMUSCULAR; INTRAVENOUS; RECTAL at 09:38

## 2023-12-14 RX ADMIN — MIDAZOLAM 1 MG: 1 INJECTION INTRAMUSCULAR; INTRAVENOUS at 09:24

## 2023-12-14 RX ADMIN — FENTANYL CITRATE 25 MCG: 50 INJECTION, SOLUTION INTRAMUSCULAR; INTRAVENOUS at 09:24

## 2023-12-14 NOTE — H&P
Pre-Procedure History and Physical  [facility]      Patient:  Lizette Gagnon  :  1975  MRN: [id number]    PCP:  April Sutherland MD  PHONE:  [phone]    DATE: 2023  ID: Lizette Gagnon is a 48 y.o. 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  Discharged on Xarelto, 10/6/2023  Changed to Eliquis due to urticaria, at 10/17/2023-patient discontinued after 1 dose  Urticaria, recurrent since URI 2023: 7 ER visits since hospitalization 10/4/2023  Patient stopped Xarelto ~10/18/2023  Multiple rounds of steroids/antihistamines-EpiPen  Allergy eval at [facility], 2023 - ?NSAID reaction   HTN  Asthma  Tobacco use  Surgical history:  Breast augmentation   section  Foot surgery        BRIEF HPI: Patient is a 48-year-old female with past medical history of PV mass, hypertension, asthma, tobacco use, and recurrent urticaria who presents today for REILLY to follow-up on PV mass.  She stopped Xarelto due to hives and feeling like her throat was swelling.  She called our office and was changed to Eliquis however she took 1 dose and symptoms persisted so she discontinued Eliquis.  Symptoms have persisted off both Xarelto and Eliquis. She has had 7 ER visits for symptoms of hives and sensation that her throat is swelling.  She has been evaluated by [facility] allergist 2023 and most recent sed rate and CRP have returned to normal.  Allergist recommended CT of the sinuses and for her to complete REILLY to rule out heart  valve vegetation.    Cardiac Risk Factors: hypertension and smoking/ tobacco exposure    Allergies:      Allergies   Allergen Reactions    Nuts Shortness Of Breath    Penicillins Shortness Of Breath and Dizziness     PEN-FAST = 3+, high risk for true allergy       MEDICATIONS:  Current Outpatient Medications   Medication Instructions    albuterol (PROVENTIL) 2.5 mg, Nebulization, Every 4 Hours PRN    albuterol sulfate  (90 Base) MCG/ACT inhaler 2 puffs, Inhalation, Every 4 Hours PRN    apixaban (ELIQUIS) 5 mg, Oral, 2 Times Daily    cholecalciferol (VITAMIN D3) 1,000 Units, Oral, Daily    escitalopram (LEXAPRO) 20 MG tablet escitalopram 20 mg tablet   Take 1 tablet by mouth once daily    Loratadine 10 MG capsule Oral    losartan (COZAAR) 50 MG tablet 2 tablets.    predniSONE (DELTASONE) 10 MG tablet 6 tab PO daily for 3 days, then 5 tab PO daily for 3 days, then 4 tab PO daily for 3 days, then 3 tab PO daily for 3 days, then 2 tab PO daily x 3 days, then 1 tab PO daily for 3 days, then stop.    Pseudoephedrine-APAP-DM (DAYQUIL MULTI-SYMPTOM COLD/FLU PO) Oral    vitamin B-1 50 mg, Oral, Daily    vitamin E (VITAMIN E) 1,000 Units, Oral, Daily       Past medical & surgical history, social and family history reviewed in the electronic medical record.    ROS: Pertinent positives listed in the HPI and problem list above. All others reviewed and negative.     Physical Exam:   /83   Pulse 63   Resp 18   SpO2 100%     Constitutional:    Alert, cooperative, in no acute distress   Neck:     No JVD, adenopathy, or thyromegaly noted.    Heart:    Regular rhythm and normal rate, normal S1 and S2, no murmur, gallop, rub, or click.    Lungs:     Clear to auscultation bilaterally, normal effort.   Abdomen:     Soft nontender, nondistended, normal bowel sounds   Extremities:   No gross deformities, edema, clubbing, or cyanosis.    Pulses:   Peripheral pulses palpable and equal bilaterally.       Labs and Diagnostic  Data:  10/29/2023  CMP: , BUN 14, CR 0.53, , K 4.4    11/1/2023   CBC: WBC 7.09, RBC 4.11, Hgb 12.1, HCT 38.5, PLTs 264    12/7/2023  Sed rate 21  CRP 3                           EKG/Tele: Sinus rhythm, rate 63    IMPRESSION:  Patient is a 48-year-old female with past medical history listed above who was found to have soft tissue mass on her pulmonic valve 10/4/2023 and she presents for REILLY to reevaluate PV mass.    PLAN:  Procedure to perform: REILLY. Risks, benefits and alternatives to the procedure explained to the patient and she understands and wishes to proceed.     KERRY Burrows

## 2023-12-20 ENCOUNTER — TELEPHONE (OUTPATIENT)
Dept: CARDIOLOGY | Facility: CLINIC | Age: 48
End: 2023-12-20
Payer: COMMERCIAL

## 2023-12-20 NOTE — TELEPHONE ENCOUNTER
Received VM from patient stating she would not be able to pay for her testing, that she will call back later in the year to reschedule both testing and follow up appointment.

## 2024-11-04 ENCOUNTER — OFFICE VISIT (OUTPATIENT)
Dept: INTERNAL MEDICINE | Facility: CLINIC | Age: 49
End: 2024-11-04
Payer: COMMERCIAL

## 2024-11-04 VITALS
TEMPERATURE: 97.3 F | SYSTOLIC BLOOD PRESSURE: 126 MMHG | HEART RATE: 72 BPM | BODY MASS INDEX: 33.92 KG/M2 | HEIGHT: 65 IN | DIASTOLIC BLOOD PRESSURE: 82 MMHG | RESPIRATION RATE: 16 BRPM | WEIGHT: 203.6 LBS

## 2024-11-04 DIAGNOSIS — N92.6 IRREGULAR BLEEDING: ICD-10-CM

## 2024-11-04 DIAGNOSIS — I10 PRIMARY HYPERTENSION: ICD-10-CM

## 2024-11-04 DIAGNOSIS — I37.8 PULMONARY VALVE MASS: ICD-10-CM

## 2024-11-04 DIAGNOSIS — F41.9 ANXIETY: Primary | ICD-10-CM

## 2024-11-04 PROCEDURE — 1126F AMNT PAIN NOTED NONE PRSNT: CPT | Performed by: NURSE PRACTITIONER

## 2024-11-04 PROCEDURE — 3074F SYST BP LT 130 MM HG: CPT | Performed by: NURSE PRACTITIONER

## 2024-11-04 PROCEDURE — 1160F RVW MEDS BY RX/DR IN RCRD: CPT | Performed by: NURSE PRACTITIONER

## 2024-11-04 PROCEDURE — 99214 OFFICE O/P EST MOD 30 MIN: CPT | Performed by: NURSE PRACTITIONER

## 2024-11-04 PROCEDURE — 3079F DIAST BP 80-89 MM HG: CPT | Performed by: NURSE PRACTITIONER

## 2024-11-04 PROCEDURE — 1159F MED LIST DOCD IN RCRD: CPT | Performed by: NURSE PRACTITIONER

## 2024-11-04 PROCEDURE — 80050 GENERAL HEALTH PANEL: CPT | Performed by: NURSE PRACTITIONER

## 2024-11-04 PROCEDURE — 84702 CHORIONIC GONADOTROPIN TEST: CPT | Performed by: NURSE PRACTITIONER

## 2024-11-04 RX ORDER — RIBOFLAVIN (VITAMIN B2) 100 MG
1 TABLET ORAL
COMMUNITY

## 2024-11-04 RX ORDER — BUSPIRONE HYDROCHLORIDE 5 MG/1
5 TABLET ORAL 3 TIMES DAILY PRN
Qty: 90 TABLET | Refills: 0 | Status: SHIPPED | OUTPATIENT
Start: 2024-11-04

## 2024-11-04 NOTE — PROGRESS NOTES
New Patient Office Visit      Patient Name: Lizette Gagnon  : 1975   MRN: [id number]     Chief Complaint:    Chief Complaint   Patient presents with    Anxiety     New patient       History of Present Illness: Lizette Gagnon is a 49 y.o. female presents to clinic today to establish care.    History of Present Illness  The patient is a 49-year-old female who presents for evaluation of multiple medical concerns.    She is seeking to establish care and has expressed interest in birth control. She has previously used birth control pills and the Depo-Provera injection. Her menstrual cycles are regular, with her last period occurring on 10/23/2024 or 10/24/2024. She has no plans for pregnancy in the near future. Her last Pap smear was conducted in 2023 by Dr. Timmons at [facility], with a follow-up appointment scheduled for 2024. She took an emergency contraceptive pill two weeks ago, which resulted in a period within a day or two.    She is currently on Lexapro 20 mg for anxiety but feels it is not sufficiently effective. She used to experience anxiety attacks while driving, which have since ceased. However, she struggles with concentration at work. She has been on anxiety medication for several years. She reports no feelings of depression, loss of interest, or lack of motivation. Her mood has improved since starting a new job. She has previously taken hydroxyzine, which she discontinued due to an allergic reaction. She experiences sporadic anxiety and used to take hydroxyzine before bed due to its sedative effect. She does not endorse any suicidal ideation.    She reports feeling fatigued, which she attributes to premenopause. She has no history of blood clots. During a previous emergency room visit, a doctor identified a small spot and prescribed Eliquis. She consulted a cardiologist due to her allergic reactions, who reassured her that there was no cause for concern. She was prescribed a blood  thinner which she took only once. She is not experiencing chest pain, shortness of breath, or palpitations.    She engages in walking as a form of exercise, approximately four times a week for 15 minutes each session.    SOCIAL HISTORY  She used to smoke.    FAMILY HISTORY  She denies any family history of thyroid cancer or pancreatitis.    ALLERGIES  She is allergic to HYDROXYZINE.      Subjective     Review of System: Review of Systems   A review of systems was performed, and the pertinent positives are noted in the HPI.      Past Medical History:   Past Medical History:   Diagnosis Date    Allergic     Tree nuts and Nsaids    Anxiety     Hypertension        Past Surgical History: History reviewed. No pertinent surgical history.    Family History:   Family History   Problem Relation Age of Onset    Anxiety disorder Mother     Hypertension Mother     Liver disease Mother        Social History:   Social History     Socioeconomic History    Marital status: Single   Tobacco Use    Smoking status: Former     Current packs/day: 0.00     Average packs/day: 1 pack/day for 30.0 years (30.0 ttl pk-yrs)     Types: Cigarettes     Quit date: 10/3/2023     Years since quittin.0    Smokeless tobacco: Never   Vaping Use    Vaping status: Never Used   Substance and Sexual Activity    Alcohol use: Yes     Alcohol/week: 7.0 standard drinks of alcohol     Types: 2 Cans of beer, 5 Shots of liquor per week     Comment: I have almost stopped drinking beer    Drug use: Never    Sexual activity: Yes     Partners: Male     Comment: I would like to start on birth control       Medications:     Current Outpatient Medications:     ascorbic acid (VITAMIN C) 100 MG tablet, Take 1 tablet by mouth., Disp: , Rfl:     Cholecalciferol 25 MCG (1000 UT) tablet, Take 1 tablet by mouth Daily., Disp: , Rfl:     cyanocobalamin ([facility] Vitamin B-12) 1000 MCG tablet, Take 1 capsule by mouth., Disp: , Rfl:     escitalopram (LEXAPRO) 20 MG tablet,  "escitalopram 20 mg tablet  Take 1 tablet by mouth once daily, Disp: , Rfl:     losartan (COZAAR) 50 MG tablet, 2 tablets., Disp: , Rfl:     Thiamine HCl (vitamin B-1) 50 MG tablet, Take 1 tablet by mouth Daily., Disp: , Rfl:     VITAMIN E 1000 UNIT capsule, Take 1 capsule by mouth Daily., Disp: , Rfl:     busPIRone (BUSPAR) 5 MG tablet, Take 1 tablet by mouth 3 (Three) Times a Day As Needed (anxiety)., Disp: 90 tablet, Rfl: 0    Allergies:   Allergies   Allergen Reactions    Nuts Shortness Of Breath    Penicillins Shortness Of Breath and Dizziness     PEN-FAST = 3+, high risk for true allergy    Nsaids Hives       Objective     Physical Exam:   Vital Signs:   Vitals:    11/04/24 1356   BP: 126/82   BP Location: Right arm   Patient Position: Sitting   Cuff Size: Adult   Pulse: 72   Resp: 16   Temp: 97.3 °F (36.3 °C)   TempSrc: Infrared   Weight: 92.4 kg (203 lb 9.6 oz)   Height: 163.8 cm (64.5\")   PainSc: 0-No pain     Body mass index is 34.41 kg/m². BMI is >= 30 and <35. (Class 1 Obesity). The following options were offered after discussion;: weight loss educational material (shared in after visit summary) and information on healthy weight added to patient's after visit summary       Physical Exam  Constitutional:       General: She is not in acute distress.     Appearance: She is not ill-appearing.   HENT:      Head: Normocephalic.   Cardiovascular:      Rate and Rhythm: Normal rate and regular rhythm.      Heart sounds: Normal heart sounds. No murmur heard.  Pulmonary:      Breath sounds: Normal breath sounds.   Abdominal:      Tenderness: There is no abdominal tenderness.   Neurological:      General: No focal deficit present.      Mental Status: She is oriented to person, place, and time.   Psychiatric:         Mood and Affect: Mood normal.   Physical Exam      Results    CT Angiogram Chest (10/04/2023 05:59)     IMPRESSION:  Impression:  1.Abnormal soft tissue appears to arise from the pulmonic valve measuring " about 1 cm. This could represent a small vegetation or soft tissue mass. Isolated thrombus in this region would be unusual.  2.There is no obvious pulmonary embolism.  3.Small bilateral pleural effusions with basilar atelectasis.  4.Right breast implant rupture.    Adult Transesophageal Echo 3D (RE[facility]Y) W/ Cont If Necessary Per Protocol (12/14/2023 12:48)      Interpretation Summary         Left ventricular ejection fraction appears to be 61 - 65%. Normal left ventricular cavity size and wall thickness noted.    Normal right ventricular cavity size and systolic function noted.    No pulmonic valve regurgitation or significant stenosis is present. There is a medium sized, mobile that is not consistent with a vegetation. The mass measures 13 mm in diameter.     Assessment / Plan      Assessment/Plan:   Diagnoses and all orders for this visit:    1. Anxiety (Primary)  -     busPIRone (BUSPAR) 5 MG tablet; Take 1 tablet by mouth 3 (Three) Times a Day As Needed (anxiety).  Dispense: 90 tablet; Refill: 0  -     Comprehensive Metabolic Panel; Future  -     TSH; Future  -     CBC & Differential; Future  -     Comprehensive Metabolic Panel  -     TSH  -     CBC & Differential    2. Primary hypertension  -     Comprehensive Metabolic Panel; Future  -     TSH; Future  -     CBC & Differential; Future  -     Comprehensive Metabolic Panel  -     TSH  -     CBC & Differential    3. Irregular bleeding  -     hCG, Quantitative, Pregnancy; Future  -     hCG, Quantitative, Pregnancy  -     POC Pregnancy, Urine    4. Pulmonary valve mass       Assessment & Plan  1. Anxiety.  Continuation of Lexapro is advised. Additionally, buspirone 5 mg up to three times a day as needed for anxiety will be added to her regimen. If the current treatment proves ineffective, a trial of Effexor may be considered. She is to contact if there are any worsening symptoms of depression.    2. Hypertension.  Her blood pressure is well-managed with the current  regimen of losartan 50 mg. Continuation of losartan 50 mg is recommended.    3. Pulmonary valve mass.  Lab work will be conducted today to confirm that she is not pregnant. Consultation with cardiologist Dr. Arreola revealed no contraindication for birth control pills. He recommended daily aspirin and a follow-up in 1 to 2 years. The contact information for the cardiology department has been provided. 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     4. Birth control.  She will start  birth control pill pending labs. Instructions on starting the pill the Sunday after her period, within the first 5 days, to reduce the chance of pregnancy were discussed. Discussed risks and adverse effects of birth control pills and she was advised to monitor for side effects and provided with additional information on birth control. Instructions on how to take the birth control pill, when to take it, what to do if a dose is missed, and monitoring for side effects were discussed. Additional information will be provided.    Follow-up  Return in 2 months for wellness follow-up and monitor for ocp.        I discussed patient's condition and plan of care.  Discussed when to follow-up.  Discussed possible red flags and how to follow-up with those.  Viewed patient's medications and discussed common side effects. Patient to continue current medications as advised.  Be compliant with medications. Patient to let me know if she has any changes in health, does not tolerate medication, or any future concerns about treatment. Patient verbalized understanding and agreement with plan of care.     Follow Up:   Return in about 2 months (around 1/4/2025) for Annual.    Patient or patient representative verbalized consent for the use of Ambient Listening during the visit with  KERRY Alcantara for chart documentation. 11/4/2024  14:55 EST    KERRY Alcantara  [facility]

## 2024-11-04 NOTE — PATIENT INSTRUCTIONS
Comprehensive Weight Loss Plan  1. Food Diary:  Objective: Track daily food intake to identify eating patterns, monitor calorie intake, and ensure adherence to dietary goals.  Action Steps:  Record every meal, snack, and beverage consumed.  Note the time of consumption and portion sizes.  Include details on macronutrients (carbohydrates, proteins, fats) if possible.  Reflect on hunger levels and emotional state when eating.  2. Calorie Limiting:  Objective: Create a caloric deficit to promote weight loss.  Action Steps:  Calculate your daily caloric needs based on age, gender, weight, height, and activity level.  Aim to reduce daily calorie intake by 500-750 calories to lose approximately 1-1.5 pounds per week.  Use a calorie tracking diony or the food diary to monitor daily intake.  3. Low Carbohydrate Diet:  Objective: Reduce carbohydrate intake to promote fat burning and stabilize blood sugar levels.  Action Steps:  Limit daily carbohydrate intake to  grams, depending on individual tolerance.  Focus on consuming non-starchy vegetables, lean proteins, and healthy fats.  Avoid refined grains, sugary foods, and high-carb processed foods.  4. Increasing Protein Intake:  Objective: Support muscle maintenance and promote satiety.  Action Steps:  Aim to consume 30 grams of protein with each meal.  Include protein-rich foods such as lean meats, fish, eggs, dairy products, legumes, and plant-based proteins.  Consider protein supplements if dietary intake is insufficient.  5. Exercise Routine:  Objective: Enhance calorie expenditure, improve cardiovascular health, and build muscle strength.  Action Steps:  Engage in moderate-intensity aerobic exercise 4-5 days per week for at least 30 minutes each session (e.g., brisk walking, cycling, swimming).  Incorporate strength training exercises 2 times per week, targeting all major muscle groups (e.g., weight lifting, resistance bands, body-weight exercises).  Include  flexibility and balance exercises as part of the routine.  6. Water Intake:  Objective: Stay hydrated to support metabolic processes and overall health.  Action Steps:  Aim to drink at least 8 glasses (64 ounces) of water per day, or more if engaging in intense physical activity.  Monitor urine color as an indicator of hydration; it should be light yellow.  Replace sugary drinks with water or other low-calorie beverages.  7. Intermittent Fasting:  Objective: Promote weight loss and improve metabolic health by controlling eating windows.  Action Steps:  Choose an intermittent fasting schedule that fits your lifestyle, such as the 16/8 method (16 hours fasting, 8 hours eating window).  During fasting periods, consume only water, black coffee, or tea without additives.  Ensure nutrient-dense meals during eating periods to meet dietary needs.    Tips for Success:  Set realistic and achievable goals.  Be consistent with the plan and make adjustments as needed.  Seek support from healthcare providers, nutritionists, or fitness trainers.  Stay motivated by tracking progress and celebrating small victories.  By following these guidelines, patients can work towards their weight loss goals in a structured and effective manner.

## 2024-11-05 ENCOUNTER — TELEPHONE (OUTPATIENT)
Dept: INTERNAL MEDICINE | Facility: CLINIC | Age: 49
End: 2024-11-05
Payer: COMMERCIAL

## 2024-11-05 DIAGNOSIS — Z30.09 BIRTH CONTROL COUNSELING: Primary | ICD-10-CM

## 2024-11-05 LAB
ALBUMIN SERPL-MCNC: 4 G/DL (ref 3.5–5.2)
ALBUMIN/GLOB SERPL: 1.3 G/DL
ALP SERPL-CCNC: 103 U/L (ref 39–117)
ALT SERPL W P-5'-P-CCNC: 11 U/L (ref 1–33)
ANION GAP SERPL CALCULATED.3IONS-SCNC: 13.5 MMOL/L (ref 5–15)
AST SERPL-CCNC: 19 U/L (ref 1–32)
BASOPHILS # BLD AUTO: 0.03 10*3/MM3 (ref 0–0.2)
BASOPHILS NFR BLD AUTO: 0.5 % (ref 0–1.5)
BILIRUB SERPL-MCNC: 0.3 MG/DL (ref 0–1.2)
BUN SERPL-MCNC: 14 MG/DL (ref 6–20)
BUN/CREAT SERPL: 16.3 (ref 7–25)
CALCIUM SPEC-SCNC: 9.2 MG/DL (ref 8.6–10.5)
CHLORIDE SERPL-SCNC: 101 MMOL/L (ref 98–107)
CO2 SERPL-SCNC: 23.5 MMOL/L (ref 22–29)
CREAT SERPL-MCNC: 0.86 MG/DL (ref 0.57–1)
DEPRECATED RDW RBC AUTO: 41.6 FL (ref 37–54)
EGFRCR SERPLBLD CKD-EPI 2021: 84 ML/MIN/1.73
EOSINOPHIL # BLD AUTO: 0.35 10*3/MM3 (ref 0–0.4)
EOSINOPHIL NFR BLD AUTO: 5.7 % (ref 0.3–6.2)
ERYTHROCYTE [DISTWIDTH] IN BLOOD BY AUTOMATED COUNT: 14.3 % (ref 12.3–15.4)
GLOBULIN UR ELPH-MCNC: 3.2 GM/DL
GLUCOSE SERPL-MCNC: 79 MG/DL (ref 65–99)
HCG INTACT+B SERPL-ACNC: <1 MIU/ML
HCT VFR BLD AUTO: 35 % (ref 34–46.6)
HGB BLD-MCNC: 11.1 G/DL (ref 12–15.9)
IMM GRANULOCYTES # BLD AUTO: 0.01 10*3/MM3 (ref 0–0.05)
IMM GRANULOCYTES NFR BLD AUTO: 0.2 % (ref 0–0.5)
LYMPHOCYTES # BLD AUTO: 2.33 10*3/MM3 (ref 0.7–3.1)
LYMPHOCYTES NFR BLD AUTO: 37.8 % (ref 19.6–45.3)
MCH RBC QN AUTO: 25.9 PG (ref 26.6–33)
MCHC RBC AUTO-ENTMCNC: 31.7 G/DL (ref 31.5–35.7)
MCV RBC AUTO: 81.6 FL (ref 79–97)
MONOCYTES # BLD AUTO: 0.48 10*3/MM3 (ref 0.1–0.9)
MONOCYTES NFR BLD AUTO: 7.8 % (ref 5–12)
NEUTROPHILS NFR BLD AUTO: 2.96 10*3/MM3 (ref 1.7–7)
NEUTROPHILS NFR BLD AUTO: 48 % (ref 42.7–76)
NRBC BLD AUTO-RTO: 0 /100 WBC (ref 0–0.2)
PLATELET # BLD AUTO: 300 10*3/MM3 (ref 140–450)
PMV BLD AUTO: 10.1 FL (ref 6–12)
POTASSIUM SERPL-SCNC: 3.9 MMOL/L (ref 3.5–5.2)
PROT SERPL-MCNC: 7.2 G/DL (ref 6–8.5)
RBC # BLD AUTO: 4.29 10*6/MM3 (ref 3.77–5.28)
SODIUM SERPL-SCNC: 138 MMOL/L (ref 136–145)
TSH SERPL DL<=0.05 MIU/L-ACNC: 1.04 UIU/ML (ref 0.27–4.2)
WBC NRBC COR # BLD AUTO: 6.16 10*3/MM3 (ref 3.4–10.8)

## 2024-11-05 RX ORDER — LEVONORGESTREL/ETHIN.ESTRADIOL 0.1-0.02MG
1 TABLET ORAL DAILY
Qty: 28 TABLET | Refills: 12 | Status: SHIPPED | OUTPATIENT
Start: 2024-11-05 | End: 2025-11-05

## 2024-11-05 NOTE — TELEPHONE ENCOUNTER
Caller: Lizette Gagnon    Relationship: Self    Best call back number: [phone]     What medication are you requesting: BIRTH CONTROL PILLS    What are your current symptoms: PREGNANCY PREVENTION    Have you had these symptoms before:    [] Yes  [] No    Have you been treated for these symptoms before:   [] Yes  [] No    If a prescription is needed, what is your preferred pharmacy and phone number: [address] [phone] [place] [phone]      Additional notes:  THE PATIENT STATED THAT THE PCP WAS GOING TO WAIT UNTIL SHE HAD THE PATIENT'S RESULTS.

## 2024-11-07 ENCOUNTER — PATIENT ROUNDING (BHMG ONLY) (OUTPATIENT)
Dept: INTERNAL MEDICINE | Facility: CLINIC | Age: 49
End: 2024-11-07
Payer: COMMERCIAL

## 2024-11-07 NOTE — PROGRESS NOTES
A [employer] message has been sent to the patient for patient rounding with [facility].

## 2024-11-27 ENCOUNTER — TELEMEDICINE (OUTPATIENT)
Dept: FAMILY MEDICINE CLINIC | Facility: TELEHEALTH | Age: 49
End: 2024-11-27
Payer: COMMERCIAL

## 2024-11-27 VITALS — HEART RATE: 75 BPM | TEMPERATURE: 98.4 F

## 2024-11-27 DIAGNOSIS — J06.9 VIRAL URI: Primary | ICD-10-CM

## 2024-11-27 DIAGNOSIS — R06.2 WHEEZING: ICD-10-CM

## 2024-11-27 DIAGNOSIS — J22 ACUTE RESPIRATORY INFECTION: ICD-10-CM

## 2024-11-27 RX ORDER — DEXTROMETHORPHAN HYDROBROMIDE AND PROMETHAZINE HYDROCHLORIDE 15; 6.25 MG/5ML; MG/5ML
5 SYRUP ORAL 4 TIMES DAILY PRN
Qty: 118 ML | Refills: 0 | Status: SHIPPED | OUTPATIENT
Start: 2024-11-27

## 2024-11-27 RX ORDER — PREDNISONE 20 MG/1
20 TABLET ORAL 2 TIMES DAILY
Qty: 10 TABLET | Refills: 0 | Status: SHIPPED | OUTPATIENT
Start: 2024-11-27 | End: 2024-12-02

## 2024-11-27 NOTE — PROGRESS NOTES
Subjective   Chief Complaint   Patient presents with    URI       Lizette Gagnon is a 49 y.o. female.     History of Present Illness  Urgent care [place] visit.  Patient reports sore throat, chest congestion, bilateral ear itching, cough and mild wheezing for the past 1.5 days.  No known sick contacts.  URI   This is a new problem. Episode onset: 1.5 days. The problem has been unchanged. Maximum temperature: tactile. Associated symptoms include congestion, coughing, rhinorrhea, a sore throat and wheezing. Pertinent negatives include no dysuria, ear pain or nausea. Treatments tried: dayquil. The treatment provided no relief.        Allergies   Allergen Reactions    Nuts Shortness Of Breath    Penicillins Shortness Of Breath and Dizziness     PEN-FAST = 3+, high risk for true allergy    Nsaids Hives       Past Medical History:   Diagnosis Date    Allergic     Tree nuts and Nsaids    Anxiety     Hypertension        History reviewed. No pertinent surgical history.    Social History     Socioeconomic History    Marital status: Single   Tobacco Use    Smoking status: Former     Current packs/day: 0.00     Average packs/day: 1 pack/day for 30.0 years (30.0 ttl pk-yrs)     Types: Cigarettes     Quit date: 10/3/2023     Years since quittin.1    Smokeless tobacco: Never   Vaping Use    Vaping status: Never Used   Substance and Sexual Activity    Alcohol use: Yes     Alcohol/week: 7.0 standard drinks of alcohol     Types: 2 Cans of beer, 5 Shots of liquor per week     Comment: I have almost stopped drinking beer    Drug use: Never    Sexual activity: Yes     Partners: Male     Comment: I would like to start on birth control       Family History   Problem Relation Age of Onset    Anxiety disorder Mother     Hypertension Mother     Liver disease Mother          Current Outpatient Medications:     ascorbic acid (VITAMIN C) 100 MG tablet, Take 1 tablet by mouth., Disp: , Rfl:     busPIRone (BUSPAR) 5 MG tablet, Take 1 tablet by  mouth 3 (Three) Times a Day As Needed (anxiety)., Disp: 90 tablet, Rfl: 0    Cholecalciferol 25 MCG (1000 UT) tablet, Take 1 tablet by mouth Daily., Disp: , Rfl:     cyanocobalamin ([facility] Vitamin B-12) 1000 MCG tablet, Take 1 capsule by mouth., Disp: , Rfl:     escitalopram (LEXAPRO) 20 MG tablet, escitalopram 20 mg tablet  Take 1 tablet by mouth once daily, Disp: , Rfl:     levonorgestrel-ethinyl estradiol (AVIANE,ALESSE,LESSINA) 0.1-20 MG-MCG per tablet, Take 1 tablet by mouth Daily., Disp: 28 tablet, Rfl: 12    losartan (COZAAR) 50 MG tablet, 2 tablets., Disp: , Rfl:     predniSONE (DELTASONE) 20 MG tablet, Take 1 tablet by mouth 2 (Two) Times a Day for 5 days., Disp: 10 tablet, Rfl: 0    promethazine-dextromethorphan (PROMETHAZINE-DM) 6.25-15 MG/5ML syrup, Take 5 mL by mouth 4 (Four) Times a Day As Needed for Cough., Disp: 118 mL, Rfl: 0    Thiamine HCl (vitamin B-1) 50 MG tablet, Take 1 tablet by mouth Daily., Disp: , Rfl:     VITAMIN E 1000 UNIT capsule, Take 1 capsule by mouth Daily., Disp: , Rfl:       Review of Systems   Constitutional:  Positive for chills, diaphoresis and fatigue.   HENT:  Positive for congestion, rhinorrhea and sore throat. Negative for ear pain.    Respiratory:  Positive for cough and wheezing. Negative for chest tightness and shortness of breath.    Gastrointestinal:  Negative for nausea.   Genitourinary:  Negative for dysuria.   Musculoskeletal:  Negative for myalgias.   Neurological:  Negative for headache.        Vitals:    11/27/24 1022   Pulse: 75   Temp: 98.4 °F (36.9 °C)       Objective   Physical Exam  Constitutional:       General: She is not in acute distress.     Appearance: Normal appearance. She is not ill-appearing, toxic-appearing or diaphoretic.   HENT:      Head: Normocephalic.      Nose: Congestion present.      Mouth/Throat:      Lips: Pink.      Mouth: Mucous membranes are moist.      Pharynx: Oropharynx is clear.   Cardiovascular:      Rate and Rhythm: Normal rate  and regular rhythm.   Pulmonary:      Effort: Pulmonary effort is normal.      Breath sounds: Normal breath sounds.   Neurological:      Mental Status: She is alert and oriented to person, place, and time.          Procedures     Assessment & Plan   Diagnoses and all orders for this visit:    1. Viral URI (Primary)  -     promethazine-dextromethorphan (PROMETHAZINE-DM) 6.25-15 MG/5ML syrup; Take 5 mL by mouth 4 (Four) Times a Day As Needed for Cough.  Dispense: 118 mL; Refill: 0    2. Wheezing  -     predniSONE (DELTASONE) 20 MG tablet; Take 1 tablet by mouth 2 (Two) Times a Day for 5 days.  Dispense: 10 tablet; Refill: 0    3. Acute respiratory infection  -     POC Strep A, PCR (Roche Mary); Future  -     MARY FLU + SARS PCR; Future    Continue to treat symptoms.   Tylenol for pain and/or fever, stay hydrated and rest.     If symptoms worsen or do not improve follow up with your PCP or visit your nearest Urgent Care Center or ER.    Results for orders placed or performed during the hospital encounter of 11/27/24   POC Strep A, PCR ([facility] Mary)    Collection Time: 11/27/24 10:55 AM    Specimen: Swab   Result Value Ref Range    STREP A PCR Not Detected     Internal Control Passed     Lot Number [id number]     Expiration Date 06/30/2026    MARY FLU + SARS PCR    Collection Time: 11/27/24 10:59 AM    Specimen: Nasal Cavity; Swab   Result Value Ref Range    COVID19 Not Detected Not Detected - Ref. Range    POC Influenza A, Molecular Not Detected Negative / Not Detected    POC Influenza B, Molecular Not Detected Negative / Not Detected    Internal Control Passed Passed    Lot Number [id number]     Expiration Date 11/30/2026        PLAN: Discussed dosing, side effects, recommended other symptomatic care.  Patient should follow up with primary care provider, Urgent Care or ER if symptoms worsen, fail to resolve or other symptoms need attention. Patient/family agree to the above.         KERRY Dimas     Mode of Visit:  Video  Location of patient: -OTHER-: [facility]  Location of provider: +HOME+  You have chosen to receive care through a telehealth visit.  The patient has signed the video visit consent form.  The visit included audio and video interaction. No technical issues occurred during this visit.        This visit was performed via Telehealth.  This patient has been instructed to follow-up with their primary care provider if their symptoms worsen or the treatment provided does not resolve their illness.

## 2024-11-27 NOTE — PATIENT INSTRUCTIONS
Continue to treat symptoms.   Tylenol for pain and/or fever, stay hydrated and rest.     If symptoms worsen or do not improve follow up with your PCP or visit your nearest Urgent Care Center or ER.

## 2024-12-17 ENCOUNTER — OFFICE VISIT (OUTPATIENT)
Dept: INTERNAL MEDICINE | Facility: CLINIC | Age: 49
End: 2024-12-17
Payer: COMMERCIAL

## 2024-12-17 VITALS
SYSTOLIC BLOOD PRESSURE: 138 MMHG | HEIGHT: 65 IN | BODY MASS INDEX: 35.45 KG/M2 | DIASTOLIC BLOOD PRESSURE: 86 MMHG | HEART RATE: 64 BPM | RESPIRATION RATE: 16 BRPM | WEIGHT: 212.8 LBS | TEMPERATURE: 97.3 F

## 2024-12-17 DIAGNOSIS — F41.9 ANXIETY: ICD-10-CM

## 2024-12-17 DIAGNOSIS — Z00.00 ENCOUNTER FOR WELLNESS EXAMINATION: Primary | ICD-10-CM

## 2024-12-17 DIAGNOSIS — Z12.31 ENCOUNTER FOR SCREENING MAMMOGRAM FOR MALIGNANT NEOPLASM OF BREAST: ICD-10-CM

## 2024-12-17 DIAGNOSIS — Z12.11 SCREENING FOR COLON CANCER: ICD-10-CM

## 2024-12-17 DIAGNOSIS — I37.8 PULMONARY VALVE MASS: ICD-10-CM

## 2024-12-17 PROCEDURE — 99214 OFFICE O/P EST MOD 30 MIN: CPT | Performed by: NURSE PRACTITIONER

## 2024-12-17 PROCEDURE — 99396 PREV VISIT EST AGE 40-64: CPT | Performed by: NURSE PRACTITIONER

## 2024-12-17 RX ORDER — BUSPIRONE HYDROCHLORIDE 5 MG/1
5 TABLET ORAL 3 TIMES DAILY PRN
Qty: 90 TABLET | Refills: 1 | Status: SHIPPED | OUTPATIENT
Start: 2024-12-17

## 2024-12-17 NOTE — PROGRESS NOTES
Female Physical Note      Patient Name: Lizette Gagnon  : 1975   MRN: [id number]     Chief Complaint:    Chief Complaint   Patient presents with    Annual Exam       History of Present Illness: Lizette Gagnon is a 49 y.o. female who is here today for their annual health maintenance and physical.  History of Present Illness  The patient is a 49-year-old female who presents with a history of anxiety, depression, pulmonary valve issue, and weight management.    She reports a positive response to buspirone in managing her anxiety and is considering discontinuing Lexapro in favor of maintaining only the buspirone regimen. She is seeking a refill of her buspirone prescription, which she typically takes once daily from Monday to Friday during her work hours as a .    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    She has successfully lost over 120 pounds approximately 20 years ago through dietary modifications. Initially, she used diet pills but discontinued their use due to associated jitteriness. She previously took Zepbound under the supervision of another physician, gradually increasing the dose over a period of 2 months, which she tolerated well.  She lost 35 pounds over the Zepbound.  She exercises 3-5 times a week participating in walking and yoga for 30-minute sessions.    She is currently on birth control pills and reports no issues with their use.    SOCIAL HISTORY  She does not smoke anymore.    FAMILY HISTORY  Her grandmother had breast cancer.    MEDICATIONS  Current: Buspirone, Lexapro    IMMUNIZATIONS  She is up to date on her vaccines.    Subjective     Review of System: Review of Systems   A review of systems was performed, and the pertinent positives are noted in the HPI.      Past Medical History:   Past Medical History:   Diagnosis Date    Allergic     Tree nuts and Nsaids    Anxiety     Hypertension        Past Surgical History: History  reviewed. No pertinent surgical history.    Family History:   Family History   Problem Relation Age of Onset    Anxiety disorder Mother     Hypertension Mother     Liver disease Mother        Social History:   Social History     Socioeconomic History    Marital status: Single   Tobacco Use    Smoking status: Former     Current packs/day: 0.00     Average packs/day: 1 pack/day for 30.0 years (30.0 ttl pk-yrs)     Types: Cigarettes     Quit date: 10/3/2023     Years since quittin.2    Smokeless tobacco: Never   Vaping Use    Vaping status: Never Used   Substance and Sexual Activity    Alcohol use: Yes     Alcohol/week: 7.0 standard drinks of alcohol     Comment: Beer socially on occasion    Drug use: Never    Sexual activity: Yes     Partners: Male     Birth control/protection: Birth control pill     Comment: I need a refill on this       Medications:     Current Outpatient Medications:     ascorbic acid (VITAMIN C) 100 MG tablet, Take 1 tablet by mouth., Disp: , Rfl:     busPIRone (BUSPAR) 5 MG tablet, Take 1 tablet by mouth 3 (Three) Times a Day As Needed (anxiety)., Disp: 90 tablet, Rfl: 1    Cholecalciferol 25 MCG (1000 UT) tablet, Take 1 tablet by mouth Daily., Disp: , Rfl:     cyanocobalamin ([facility] Vitamin B-12) 1000 MCG tablet, Take 1 capsule by mouth., Disp: , Rfl:     escitalopram (LEXAPRO) 20 MG tablet, escitalopram 20 mg tablet  Take 1 tablet by mouth once daily, Disp: , Rfl:     levonorgestrel-ethinyl estradiol (AVIANE,ALESSE,LESSINA) 0.1-20 MG-MCG per tablet, Take 1 tablet by mouth Daily., Disp: 28 tablet, Rfl: 12    losartan (COZAAR) 50 MG tablet, 2 tablets., Disp: , Rfl:     Thiamine HCl (vitamin B-1) 50 MG tablet, Take 1 tablet by mouth Daily., Disp: , Rfl:     VITAMIN E 1000 UNIT capsule, Take 1 capsule by mouth Daily., Disp: , Rfl:     Tirzepatide-Weight Management (ZEPBOUND) 2.5 MG/0.5ML solution auto-injector, Inject 0.5 mL under the skin into the appropriate area as directed 1 (One) Time Per  Week., Disp: 2 mL, Rfl: 1    Allergies:   Allergies   Allergen Reactions    Nuts Shortness Of Breath    Penicillins Shortness Of Breath and Dizziness     PEN-FAST = 3+, high risk for true allergy    Nsaids Hives       Reviewed immunization history and updated state vaccination form as needed. Up to date.      Colorectal Screening:     Last Completed Colonoscopy       This patient has no relevant Health Maintenance data.           Pa:    Last Completed Pap Smear            PAP SMEAR (Every 3 Years) Next due on 1/12/2026 01/12/2023  Patient-Reported (Performed Externally)    01/12/2023  PAP SMEAR SCANNED                   Mammogram:    Last Completed Mammogram       This patient has no relevant Health Maintenance data.              Intimate partner violence: (Screen on initial visit, pregnant women, women with injuries, older adult with injury or evidence of neglect):  Violence can be a problem in many people's lives, so I now ask every patient about trauma or abuse they may have experienced in a relationship.  Stress/Safety - Do you feel safe in your relationship?  Afraid/Abused - Have you ever been in a relationship where you were threatened, hurt, or afraid?  Friend/Family - Are your friends aware you have been hurt?  Emergency Plan - Do you have a safe place to go and the resources you need in an emergency?    Patient feels safe.     Osteoporosis:   Post menopausal women < 65 with RF (advancing age, previous fracture, glucocorticoid therapy, parental hip fracture, low body weight, current cigarette smoking, excessive alcohol consumption, rheumatoid arthritis, secondary osteoporosis [hypogonadism/premature menopause, malabsorption, chronic liver disease, IBD]).  All women 65 or older    Objective     Physical Exam:  Vital Signs:   Vitals:    12/17/24 1502   BP: 138/86   BP Location: Right arm   Patient Position: Sitting   Cuff Size: Adult   Pulse: 64   Resp: 16   Temp: 97.3 °F (36.3 °C)   TempSrc: Infrared  "  Weight: 96.5 kg (212 lb 12.8 oz)   Height: 163.8 cm (64.5\")   PainSc: 0-No pain     Body mass index is 35.96 kg/m².  Information provided on after visit summary.    Physical Exam  Vitals and nursing note reviewed.   Constitutional:       General: She is not in acute distress.     Appearance: Normal appearance. She is not ill-appearing.   HENT:      Head: Normocephalic.      Right Ear: Tympanic membrane, ear canal and external ear normal. There is no impacted cerumen.      Left Ear: Tympanic membrane, ear canal and external ear normal. There is no impacted cerumen.      Nose: No nasal tenderness or rhinorrhea.      Mouth/Throat:      Mouth: Mucous membranes are moist.      Pharynx: Oropharynx is clear. No oropharyngeal exudate or posterior oropharyngeal erythema.   Eyes:      General:         Right eye: No discharge.         Left eye: No discharge.      Extraocular Movements: Extraocular movements intact.      Conjunctiva/sclera: Conjunctivae normal.      Pupils: Pupils are equal, round, and reactive to light.   Neck:      Thyroid: No thyromegaly.      Vascular: No carotid bruit.   Cardiovascular:      Rate and Rhythm: Normal rate and regular rhythm.      Pulses: Normal pulses.      Heart sounds: Normal heart sounds. No murmur heard.     No gallop.   Pulmonary:      Effort: Pulmonary effort is normal.      Breath sounds: Normal breath sounds. No wheezing, rhonchi or rales.   Abdominal:      General: Bowel sounds are normal.      Palpations: Abdomen is soft. There is no mass.      Tenderness: There is no abdominal tenderness. There is no right CVA tenderness or left CVA tenderness.   Musculoskeletal:         General: No swelling or tenderness. Normal range of motion.      Cervical back: Normal range of motion.      Right lower leg: No edema.      Left lower leg: No edema.   Lymphadenopathy:      Cervical: No cervical adenopathy.   Skin:     General: Skin is warm and dry.      Capillary Refill: Capillary refill takes " less than 2 seconds.      Findings: No erythema or rash.      Comments: No suspicious skin lesions   Neurological:      General: No focal deficit present.      Mental Status: She is alert and oriented to person, place, and time.      Motor: No weakness.   Psychiatric:         Mood and Affect: Mood normal.         Behavior: Behavior is cooperative.         Cognition and Memory: She does not exhibit impaired recent memory.     Patient declined pelvic and breast exam.  Physical Exam      Procedures    Assessment / Plan      Assessment/Plan:   Diagnoses and all orders for this visit:    1. Encounter for wellness examination (Primary)    2. Encounter for screening mammogram for malignant neoplasm of breast  -     Mammo Screening Digital Tomosynthesis Bilateral With CAD; Future    3. Screening for colon cancer  -     Ambulatory Referral For Screening Colonoscopy    4. Pulmonary valve mass  -     Ambulatory Referral to Cardiology    5. Anxiety  -     busPIRone (BUSPAR) 5 MG tablet; Take 1 tablet by mouth 3 (Three) Times a Day As Needed (anxiety).  Dispense: 90 tablet; Refill: 1    6. BMI 35.0-35.9,adult  -     Tirzepatide-Weight Management (ZEPBOUND) 2.5 MG/0.5ML solution auto-injector; Inject 0.5 mL under the skin into the appropriate area as directed 1 (One) Time Per Week.  Dispense: 2 mL; Refill: 1       Assessment & Plan  1. Anxiety.  Buspirone is effectively managing her anxiety symptoms. A prescription for a 90-day supply of buspirone will be provided.    2. Depression.  Lexapro is currently being used to manage her depression. She is advised to gradually reduce her Lexapro dosage to 10 mg for one month.  We will discuss a further decrease if patient tolerates.    3. Pulmonary valve issue.  A referral to cardiology will be made for further evaluation of her pulmonary valve condition.    4. Health maintenance.  She is due for a mammogram and colonoscopy. Orders for these screenings will be placed. She is up to date on  her vaccinations.    5. Weight management.  Patient has tolerated indication for use.  Trial Zepbound will be sent to pharmacy.  Patient will continue exercise routine and comprehensive weight loss plan was [facility] to [facility].    6. Birth control management.  Patient has tolerated her birth control well.  Discussed potential risks including cardiovascular risk.  Discussed other birth control options that would be lower cardiovascular risk.  Patient verbalized understanding and would like to proceed with birth control pills.  Recommend annual pelvic exam which will be due next month.  Pap smear is up-to-date.    Discussed current problems may cause a copay for this visit. Patient verbalized an understanding and agreement with plan.      Recommend seatbelt, sunscreen, helmet when necessary, smoke detectors and carbon monoxide detectors in the home.   Patient to schedule eye, dental exam if not up-to-date and dermatology if needed.    Explained and discussed patient's condition and plan of care including labs and radiology that may be ordered.  Discussed when to follow-up.  Discussed possible red flags and how to follow-up with those.  Viewed patient's medications and discussed common side effects and symptoms to report. Patient to continue current medications as advised.  Be compliant with medications. Patient to call clinic if they have any worsening, no improvement, does not tolerate medication, or any future concerns about treatment.  Questions and concerns addressed at this appointment.  Patient to report to ER for emergencies.  Patient verbalized an understanding and agreement with plan of care.      Follow Up:   Return in about 4 weeks (around 1/14/2025) for Recheck.    Patient or patient representative verbalized consent for the use of Ambient Listening during the visit with  KERRY Alcantara for chart documentation. 12/17/2024  17:31 EST      Health Maintenance, Female  Adopting a healthy lifestyle and  getting preventive care can go a long way to promote health and wellness. Talk with your health care provider about what schedule of regular examinations is right for you. This is a good chance for you to check in with your provider about disease prevention and staying healthy.  In between checkups, there are plenty of things you can do on your own. Experts have done a lot of research about which lifestyle changes and preventive measures are most likely to keep you healthy. Ask your health care provider for more information.  Weight and diet  Eat a healthy diet  Be sure to include plenty of vegetables, fruits, low-fat dairy products, and lean protein.  Do not eat a lot of foods high in solid fats, added sugars, or salt.  Get regular exercise. This is one of the most important things you can do for your health.  Most adults should exercise for at least 150 minutes each week. The exercise should increase your heart rate and make you sweat (moderate-intensity exercise).  Most adults should also do strengthening exercises at least twice a week. This is in addition to the moderate-intensity exercise.     Maintain a healthy weight  Body mass index (BMI) is a measurement that can be used to identify possible weight problems. It estimates body fat based on height and weight. Your health care provider can help determine your BMI and help you achieve or maintain a healthy weight.  For females 20 years of age and older:  A BMI below 18.5 is considered underweight.  A BMI of 18.5 to 24.9 is normal.  A BMI of 25 to 29.9 is considered overweight.  A BMI of 30 and above is considered obese.     Watch levels of cholesterol and blood lipids  You should start having your blood tested for lipids and cholesterol at 20 years of age, then have this test every 5 years.  You may need to have your cholesterol levels checked more often if:  Your lipid or cholesterol levels are high.  You are older than 50 years of age.  You are at high risk  for heart disease.     Cancer screening  Lung Cancer  Lung cancer screening is recommended for adults 55-80 years old who are at high risk for lung cancer because of a history of smoking.  A yearly low-dose CT scan of the lungs is recommended for people who:  Currently smoke.  Have quit within the past 15 years.  Have at least a 30-pack-year history of smoking. A pack year is smoking an average of one pack of cigarettes a day for 1 year.  Yearly screening should continue until it has been 15 years since you quit.  Yearly screening should stop if you develop a health problem that would prevent you from having lung cancer treatment.     Breast Cancer  Practice breast self-awareness. This means understanding how your breasts normally appear and feel.  It also means doing regular breast self-exams. Let your health care provider know about any changes, no matter how small.  If you are in your 20s or 30s, you should have a clinical breast exam (CBE) by a health care provider every 1-3 years as part of a regular health exam.  If you are 40 or older, have a CBE every year. Also consider having a breast X-ray (mammogram) every year.  If you have a family history of breast cancer, talk to your health care provider about genetic screening.  If you are at high risk for breast cancer, talk to your health care provider about having an MRI and a mammogram every year.  Breast cancer gene (BRCA) assessment is recommended for women who have family members with BRCA-related cancers. BRCA-related cancers include:  Breast.  Ovarian.  Tubal.  Peritoneal cancers.  Results of the assessment will determine the need for genetic counseling and BRCA1 and BRCA2 testing.     Cervical Cancer  Your health care provider may recommend that you be screened regularly for cancer of the pelvic organs (ovaries, uterus, and vagina). This screening involves a pelvic examination, including checking for microscopic changes to the surface of your cervix (Pap  test). You may be encouraged to have this screening done every 3 years, beginning at age 21.  For women ages 30-65, health care providers may recommend pelvic exams and Pap testing every 3 years, or they may recommend the Pap and pelvic exam, combined with testing for human papilloma virus (HPV), every 5 years. Some types of HPV increase your risk of cervical cancer. Testing for HPV may also be done on women of any age with unclear Pap test results.  Other health care providers may not recommend any screening for nonpregnant women who are considered low risk for pelvic cancer and who do not have symptoms. Ask your health care provider if a screening pelvic exam is right for you.  If you have had past treatment for cervical cancer or a condition that could lead to cancer, you need Pap tests and screening for cancer for at least 20 years after your treatment. If Pap tests have been discontinued, your risk factors (such as having a new sexual partner) need to be reassessed to determine if screening should resume. Some women have medical problems that increase the chance of getting cervical cancer. In these cases, your health care provider may recommend more frequent screening and Pap tests.     Colorectal Cancer  This type of cancer can be detected and often prevented.  Routine colorectal cancer screening usually begins at 50 years of age and continues through 75 years of age.  Your health care provider may recommend screening at an earlier age if you have risk factors for colon cancer.  Your health care provider may also recommend using home test kits to check for hidden blood in the stool.  A small camera at the end of a tube can be used to examine your colon directly (sigmoidoscopy or colonoscopy). This is done to check for the earliest forms of colorectal cancer.  Routine screening usually begins at age 50.  Direct examination of the colon should be repeated every 5-10 years through 75 years of age. However, you  may need to be screened more often if early forms of precancerous polyps or small growths are found.     Skin Cancer  Check your skin from head to toe regularly.  Tell your health care provider about any new moles or changes in moles, especially if there is a change in a mole's shape or color.  Also tell your health care provider if you have a mole that is larger than the size of a pencil eraser.  Always use sunscreen. Apply sunscreen liberally and repeatedly throughout the day.  Protect yourself by wearing long sleeves, pants, a wide-brimmed hat, and sunglasses whenever you are outside.     Heart disease, diabetes, and high blood pressure  High blood pressure causes heart disease and increases the risk of stroke. High blood pressure is more likely to develop in:  People who have blood pressure in the high end of the normal range (130-139/85-89 mm Hg).  People who are overweight or obese.  People who are .  If you are 18-39 years of age, have your blood pressure checked every 3-5 years. If you are 40 years of age or older, have your blood pressure checked every year. You should have your blood pressure measured twice--once when you are at a hospital or clinic, and once when you are not at a hospital or clinic. Record the average of the two measurements. To check your blood pressure when you are not at a hospital or clinic, you can use:  An automated blood pressure machine at a pharmacy.  A home blood pressure monitor.  If you are between 55 years and 79 years old, ask your health care provider if you should take aspirin to prevent strokes.  Have regular diabetes screenings. This involves taking a blood sample to check your fasting blood sugar level.  If you are at a normal weight and have a low risk for diabetes, have this test once every three years after 45 years of age.  If you are overweight and have a high risk for diabetes, consider being tested at a younger age or more often.  Preventing  infection  Hepatitis B  If you have a higher risk for hepatitis B, you should be screened for this virus. You are considered at high risk for hepatitis B if:  You were born in a country where hepatitis B is common. Ask your health care provider which countries are considered high risk.  Your parents were born in a high-risk country, and you have not been immunized against hepatitis B (hepatitis B vaccine).  You have HIV or AIDS.  You use needles to inject street drugs.  You live with someone who has hepatitis B.  You have had sex with someone who has hepatitis B.  You get hemodialysis treatment.  You take certain medicines for conditions, including cancer, organ transplantation, and autoimmune conditions.     Hepatitis C  Blood testing is recommended for:  Everyone born from 1945 through 1965.  Anyone with known risk factors for hepatitis C.     Sexually transmitted infections (STIs)  You should be screened for sexually transmitted infections (STIs) including gonorrhea and chlamydia if:  You are sexually active and are younger than 24 years of age.  You are older than 24 years of age and your health care provider tells you that you are at risk for this type of infection.  Your sexual activity has changed since you were last screened and you are at an increased risk for chlamydia or gonorrhea. Ask your health care provider if you are at risk.  If you do not have HIV, but are at risk, it may be recommended that you take a prescription medicine daily to prevent HIV infection. This is called pre-exposure prophylaxis (PrEP). You are considered at risk if:  You are sexually active and do not regularly use condoms or know the HIV status of your partner(s).  You take drugs by injection.  You are sexually active with a partner who has HIV.     Talk with your health care provider about whether you are at high risk of being infected with HIV. If you choose to begin PrEP, you should first be tested for HIV. You should then be  tested every 3 months for as long as you are taking PrEP.  Pregnancy  If you are premenopausal and you may become pregnant, ask your health care provider about preconception counseling.  If you may become pregnant, take 400 to 800 micrograms (mcg) of folic acid every day.  If you want to prevent pregnancy, talk to your health care provider about birth control (contraception).  Osteoporosis and menopause  Osteoporosis is a disease in which the bones lose minerals and strength with aging. This can result in serious bone fractures. Your risk for osteoporosis can be identified using a bone density scan.  If you are 65 years of age or older, or if you are at risk for osteoporosis and fractures, ask your health care provider if you should be screened.  Ask your health care provider whether you should take a calcium or vitamin D supplement to lower your risk for osteoporosis.  Menopause may have certain physical symptoms and risks.  Hormone replacement therapy may reduce some of these symptoms and risks.  Talk to your health care provider about whether hormone replacement therapy is right for you.  Follow these instructions at home:  Schedule regular health, dental, and eye exams.  Stay current with your immunizations.  Do not use any tobacco products including cigarettes, chewing tobacco, or electronic cigarettes.  If you are pregnant, do not drink alcohol.  If you are breastfeeding, limit how much and how often you drink alcohol.  Limit alcohol intake to no more than 1 drink per day for nonpregnant women. One drink equals 12 ounces of beer, 5 ounces of wine, or 1½ ounces of hard liquor.  Do not use street drugs.  Do not share needles.  Ask your health care provider for help if you need support or information about quitting drugs.  Tell your health care provider if you often feel depressed.  Tell your health care provider if you have ever been abused or do not feel safe at home.  This information is not intended to  replace advice given to you by your health care provider. Make sure you discuss any questions you have with your health care provider.  Document Released: 07/02/2012 Document Revised: 05/25/2017 Document Reviewed: 09/20/2016  [employer] Patient Education © 2018 [employer]   Lizetet Gagnon voices understanding and acceptance of this advice and will call back with any further questions or concerns. AVS with preventive healthcare tips printed for patient.     KERRY Alcantara  [facility]

## 2025-01-14 ENCOUNTER — TELEMEDICINE (OUTPATIENT)
Dept: INTERNAL MEDICINE | Facility: CLINIC | Age: 50
End: 2025-01-14
Payer: COMMERCIAL

## 2025-01-14 DIAGNOSIS — I37.8 PULMONARY VALVE MASS: ICD-10-CM

## 2025-01-14 DIAGNOSIS — Z30.09 BIRTH CONTROL COUNSELING: ICD-10-CM

## 2025-01-14 DIAGNOSIS — F41.9 ANXIETY: Primary | ICD-10-CM

## 2025-01-14 PROCEDURE — 99214 OFFICE O/P EST MOD 30 MIN: CPT | Performed by: NURSE PRACTITIONER

## 2025-01-14 RX ORDER — LOSARTAN POTASSIUM 100 MG/1
TABLET ORAL
COMMUNITY
Start: 2024-12-17

## 2025-01-14 NOTE — PROGRESS NOTES
Patient Name: Lizette Gagnon  : 1975   MRN: [id number]     Chief Complaint:    Chief Complaint   Patient presents with    Anxiety     Follow up       History of Present Illness: Lizette Gagnon is a 49 y.o. female.  History of Present Illness  The patient presents for an anxiety follow-up, depression, weight management, and birth control.    She has been taking buspirone 5 mg as needed for anxiety, which has been managing her symptoms well.  She has been on Lexapro 20 mg but is gradually weaning off of it. Currently, she is taking 10 mg daily and has tolerated it well.    She is currently taking Zepbound 2.5 mg weekly for weight management and has lost several pounds. She would like to  continue with the 2.5 mg dose and escalate the dose once it becomes ineffective. She prefers a slow titration and has not experienced any side effects such as nausea, vomiting, diarrhea, or constipation.    She is concerned about possibility of pregnancy with oral contraception and Zepbound.  Review of the literature shows there is some risk for decreased levels of birth control pills.  She is interested in Nexplanon for birth control. She had weight gain with Depo-Provera.    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    Supplemental Information  She has had her mammogram done.    MEDICATIONS  Current: Buspirone 5 mg as needed, Lexapro 10 mg daily, Zepbound 2.5 mg weekly.  You have chosen to receive care through a telehealth visit. Patient consents to video/audio connection for your medical care today.   This visit completed via [employer].    Patient is home and provider is at [facility] [place] [address].        Subjective     Review of System: Review of Systems     Medications:     Current Outpatient Medications:     ascorbic acid (VITAMIN C) 100 MG tablet, Take 1 tablet by mouth., Disp: , Rfl:      busPIRone (BUSPAR) 5 MG tablet, Take 1 tablet by mouth 3 (Three) Times a Day As Needed (anxiety)., Disp: 90 tablet, Rfl: 1    Cholecalciferol 25 MCG (1000 UT) tablet, Take 1 tablet by mouth Daily., Disp: , Rfl:     cyanocobalamin ([employer] Vitamin B-12) 1000 MCG tablet, Take 1 capsule by mouth., Disp: , Rfl:     escitalopram (LEXAPRO) 20 MG tablet, Take 5 mg by mouth Daily., Disp: , Rfl:     levonorgestrel-ethinyl estradiol (AVIANE,ALESSE,LESSINA) 0.1-20 MG-MCG per tablet, Take 1 tablet by mouth Daily., Disp: 28 tablet, Rfl: 12    losartan (COZAAR) 100 MG tablet, , Disp: , Rfl:     Thiamine HCl (vitamin B-1) 50 MG tablet, Take 1 tablet by mouth Daily., Disp: , Rfl:     Tirzepatide-Weight Management (ZEPBOUND) 2.5 MG/0.5ML solution auto-injector, Inject 0.5 mL under the skin into the appropriate area as directed 1 (One) Time Per Week., Disp: 2 mL, Rfl: 1    VITAMIN E 1000 UNIT capsule, Take 1 capsule by mouth Daily., Disp: , Rfl:     Allergies:   Allergies   Allergen Reactions    Nuts Shortness Of Breath    Penicillins Shortness Of Breath and Dizziness     PEN-FAST = 3+, high risk for true allergy    Nsaids Hives       Objective     Physical Exam:   Vital Signs:   Vitals:    01/14/25 1537   PainSc: 0-No pain     There is no height or weight on file to calculate BMI.        Physical Exam  Physical Exam  Patient appears well.  Respiratory effort is normal.  Mood is normal.       Results       Assessment / Plan      Assessment/Plan:   Diagnoses and all orders for this visit:    1. Anxiety (Primary)    2. Birth control counseling    3. BMI 35.0-35.9,adult    4. Pulmonary valve mass       Assessment & Plan  1. Anxiety.  She will continue buspirone 5 mg 3 times a day as needed for anxiety.  She is weaning off of Lexapro. She will take 5 mg for the next 2 weeks and then stop the dose. She will reach out once she discontinues Lexapro completely, and it will be removed from her medication list.    2. Weight management.  She  will continue Zepbound 2.5 mg weekly, monitor for side effects, and reach out when she wants to escalate the dose. She will continue exercise and diet.    3. Birth control.  Nexplanon insertion will be facilitated. Possible side effects and risks were discussed, and she verbalized understanding.    4. Pulmonary valve mass.  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 No chest complaints were reported.    Follow-up  The patient will follow up in 3 months.       Explained and discussed patient's condition and plan of care including labs and radiology that may be ordered.  Discussed when to follow-up.  Discussed possible red flags and how to follow-up with those.  Viewed patient's medications and discussed common side effects and symptoms to report. Patient to continue current medications as advised.  Be compliant with medications. Patient to call clinic if they have any worsening, no improvement, does not tolerate medication, or any future concerns about treatment.  Questions and concerns addressed at this appointment.  Patient to report to ER for emergencies.  Patient verbalized an understanding and agreement with plan of care.      Follow Up:   Return in about 3 months (around 4/14/2025) for Recheck.    Utilized [employer]  recorder after the visit.  KERRY Alcantara  [facility]

## 2025-01-27 ENCOUNTER — PROCEDURE VISIT (OUTPATIENT)
Dept: INTERNAL MEDICINE | Facility: CLINIC | Age: 50
End: 2025-01-27
Payer: COMMERCIAL

## 2025-01-27 VITALS
RESPIRATION RATE: 18 BRPM | DIASTOLIC BLOOD PRESSURE: 88 MMHG | WEIGHT: 210.8 LBS | HEART RATE: 74 BPM | SYSTOLIC BLOOD PRESSURE: 144 MMHG | TEMPERATURE: 97.1 F | BODY MASS INDEX: 35.62 KG/M2

## 2025-01-27 DIAGNOSIS — Z30.017 NEXPLANON INSERTION: ICD-10-CM

## 2025-01-27 DIAGNOSIS — Z30.09 ENCOUNTER FOR OTHER GENERAL COUNSELING OR ADVICE ON CONTRACEPTION: Primary | ICD-10-CM

## 2025-01-27 PROCEDURE — 99213 OFFICE O/P EST LOW 20 MIN: CPT | Performed by: STUDENT IN AN ORGANIZED HEALTH CARE EDUCATION/TRAINING PROGRAM

## 2025-01-27 PROCEDURE — 11981 INSERTION DRUG DLVR IMPLANT: CPT | Performed by: STUDENT IN AN ORGANIZED HEALTH CARE EDUCATION/TRAINING PROGRAM

## 2025-01-27 NOTE — PROGRESS NOTES
Follow Up Office Visit      Date: 2025   Patient Name: Lizette Gagnon  : 1975   MRN: [id number]     Chief Complaint:    Chief Complaint   Patient presents with    Contraception     In office procedure       History of Present Illness: Lizette Gagnon is a 49 y.o. female who is here today to follow up with chronic care management.    History of Present Illness  The patient is a 49-year-old female who presents for Nexplanon placement and contraception management.    Contraception Management  She has been utilizing oral contraceptives as her primary method of birth control, with the most recent dose administered yesterday. She reports a regular menstrual cycle.  - Onset: Most recent dose administered yesterday.  - Character: Regular menstrual cycle.    Concerns About Scar from Previous Surgery  Additionally, she mentions a history of significant weight loss, approximately 100 pounds, which occurred 15 years ago. This weight loss was followed by a surgical procedure to remove excess skin from her arms, resulting in a scar. She expresses concern about the potential impact of this scar on the Nexplanon placement.  - Onset: Weight loss occurred 15 years ago.  - Character: Significant weight loss followed by surgical removal of excess skin, resulting in a scar.  - Severity: Concern about the potential impact of the scar on Nexplanon placement.      Subjective      Review of Systems:   Review of Systems   All other systems reviewed and are negative.      I have reviewed the patients family history, social history, past medical history, past surgical history and have updated it as appropriate.     Medications:     Current Outpatient Medications:     losartan (COZAAR) 100 MG tablet, , Disp: , Rfl:     Thiamine HCl (vitamin B-1) 50 MG tablet, Take 1 tablet by mouth Daily., Disp: , Rfl:     Tirzepatide-Weight Management (ZEPBOUND) 2.5 MG/0.5ML solution auto-injector, Inject 0.5 mL under the skin into the  appropriate area as directed 1 (One) Time Per Week., Disp: 2 mL, Rfl: 1    VITAMIN E 1000 UNIT capsule, Take 1 capsule by mouth Daily., Disp: , Rfl:     ascorbic acid (VITAMIN C) 100 MG tablet, Take 1 tablet by mouth., Disp: , Rfl:     busPIRone (BUSPAR) 5 MG tablet, Take 1 tablet by mouth 3 (Three) Times a Day As Needed (anxiety)., Disp: 90 tablet, Rfl: 1    Cholecalciferol 25 MCG (1000 UT) tablet, Take 1 tablet by mouth Daily., Disp: , Rfl:     cyanocobalamin ([employer] Vitamin B-12) 1000 MCG tablet, Take 1 capsule by mouth., Disp: , Rfl:     Allergies:   Allergies   Allergen Reactions    Nuts Shortness Of Breath    Penicillins Shortness Of Breath and Dizziness     PEN-FAST = 3+, high risk for true allergy    Nsaids Hives       Objective     Physical Exam: Please see above  Vital Signs:   Vitals:    01/27/25 1518   BP: 144/88   BP Location: Right arm   Patient Position: Sitting   Cuff Size: Large Adult   Pulse: 74   Resp: 18   Temp: 97.1 °F (36.2 °C)   TempSrc: Temporal   Weight: 95.6 kg (210 lb 12.8 oz)   PainSc: 0-No pain     Body mass index is 35.62 kg/m².          Physical Exam  Vitals and nursing note reviewed.   Constitutional:       General: She is not in acute distress.     Appearance: Normal appearance. She is obese. She is not ill-appearing or toxic-appearing.   HENT:      Nose: No congestion or rhinorrhea.   Eyes:      General:         Right eye: No discharge.         Left eye: No discharge.      Conjunctiva/sclera: Conjunctivae normal.   Pulmonary:      Effort: Pulmonary effort is normal. No respiratory distress.   Abdominal:      General: Abdomen is flat. There is no distension.   Musculoskeletal:      Cervical back: Normal range of motion.   Skin:     Coloration: Skin is not jaundiced.      Findings: No rash.   Neurological:      General: No focal deficit present.      Mental Status: She is alert. Mental status is at baseline.      Coordination: Coordination normal.      Gait: Gait normal.   Psychiatric:    "      Mood and Affect: Mood normal.         Behavior: Behavior normal.         Thought Content: Thought content normal.         Judgment: Judgment normal.       Procedures  **Procedure Summary: Nexplanon Insertion**  **Procedure Performed:**    Nexplanon insertion in the left arm.    **Indications:**    Contraception management.    **Pre-Procedure Preparation:**    - A \"time out\" was conducted to confirm patient identity, procedure, and site.  - The patient was positioned comfortably, and the left arm was prepped with a sterile technique.    **Anesthesia:**    - Local anesthetic administered using 5 cc of lidocaine with epinephrine.    **Procedure Details:**    - The initial Nexplanon device was found to be malfunctioning, as it was already dispensed past the needle upon opening the package.  - A new Nexplanon device was obtained and inserted without any complications.  - The insertion site was closed and secured with a steri-strip and antibiotic ointment was applied.    **Post-Procedure Care:**    - The patient was provided with verbal and written counseling regarding home care and signs of potential complications.  - Instructions included keeping the site clean and dry, monitoring for signs of infection, and when to seek medical attention.    **Complications:**    - None.    Results:   Results      Labs:   TSH   Date Value Ref Range Status   11/04/2024 1.040 0.270 - 4.200 uIU/mL Final        Imaging:   No valid procedures specified.     Assessment / Plan      Assessment/Plan:   Problem List Items Addressed This Visit    None  Visit Diagnoses       Encounter for other general counseling or advice on contraception    -  Primary    Nexplanon insertion                Assessment & Plan  1. Contraception management  - Comprehensive discussion on transitioning from oral contraceptives to Nexplanon, including potential side effects and benefits  - Nexplanon is 99.9% effective as a form of birth control  - Discussed " potential side effects such as alterations in menstrual cycles, heavier bleeding initially, followed by spotting, and possibly no periods eventually  - Mentioned possibility of erratic bleeding patterns  - Reassured that the presence of a scar from previous skin removal surgery would not interfere with Nexplanon placement  - Advised to discontinue current oral contraceptive regimen immediately post-procedure  - Recommended use of condoms for several weeks post-procedure to ensure adequate therapeutic levels of progesterone  - Instructed to monitor insertion site for signs of infection (pus discharge, excessive redness, swelling) and report any concerns promptly  - Advised to apply Neosporin and a Band-Aid over the insertion site initially and to use Steri-Strips if necessary  - Provided a handout detailing the procedure and post-procedure care  - Nexplanon device to be placed in her left arm as per her preference    PROCEDURE  Procedure Performed  Nexplanon placement    Follow Up:   Return in about 1 week (around 2/3/2025) for Recheck.        Patient or patient representative verbalized consent for the use of Ambient Listening during the visit with  Helio Oden MD for chart documentation. 1/27/2025  22:35 EST    Helio Oden MD  [facility] [place]

## 2025-01-27 NOTE — PATIENT INSTRUCTIONS
**Immediate Post-Placement Care**    1. **Pressure Bandage**: A pressure bandage will be applied to your arm post-procedure. Keep this bandage on for 24 hours to minimize swelling and bruising.    2. **Sterile Bandage**: Underneath the pressure bandage is a smaller sterile bandage. Keep this on for 3 to 5 days to protect the site from infection.    3. **Avoid Wetting the Area**: Try to keep the bandage dry during the initial 24 hours. You may shower, but avoid submerging the area in water.    **Managing Discomfort**    - **Pain and Swelling**: Mild pain, swelling, and bruising are normal. You may take over-the-counter pain relievers such as acetaminophen or ibuprofen to alleviate discomfort.      - **Ice Application**: Applying an ice pack wrapped in a cloth to the area for 10-15 minutes can help reduce swelling and pain.    **Activity Restrictions**    - **Arm Use**: Avoid heavy lifting or strenuous activity with the affected arm for a few days. This will help reduce the risk of irritation or displacement of the implant.    - **Exercise**: You can resume normal activities and exercise after a few days, but listen to your body and avoid any movements that cause pain or discomfort.    **Monitoring for Complications**    - **Infection Signs**: Watch for signs of infection, such as increased redness, swelling, warmth, or pus at the insertion site. If you experience these symptoms, contact our office immediately.    - **Implant Visibility**: The implant should be palpable under the skin. If you cannot feel the implant or notice any unusual changes, please contact us.    **Follow-Up Care**    - **Routine Check-Ups**: Schedule a follow-up appointment if recommended by your physician to ensure the implant is in place and functioning correctly.    - **Menstrual Changes**: Irregular bleeding patterns, including spotting or changes in menstrual flow, are common. If you have concerns about bleeding patterns, discuss them  during your follow-up visit.    **When to Seek Medical Attention**    - Severe pain or swelling  - Signs of infection  - Implant migration (if you feel it has moved)    After having the Nexplanon implant placed, it is generally recommended to wait at least 7 days before having unprotected intercourse. This waiting period ensures that the implant is fully effective in preventing pregnancy. If Nexplanon is inserted during the first five days of your menstrual cycle, it is effective immediately, and additional contraception is not necessary. However, if it is inserted at any other time in your cycle, use an additional method of contraception, such as condoms, for the first 7 days. Always follow the specific guidance given by your healthcare provider.    **Contact Information**    If you have any questions or concerns, please do not hesitate to reach out to our office.

## 2025-03-04 ENCOUNTER — PATIENT MESSAGE (OUTPATIENT)
Dept: INTERNAL MEDICINE | Facility: CLINIC | Age: 50
End: 2025-03-04
Payer: COMMERCIAL

## 2025-03-04 DIAGNOSIS — F41.9 ANXIETY: Primary | ICD-10-CM

## 2025-03-04 RX ORDER — ESCITALOPRAM OXALATE 10 MG/1
10 TABLET ORAL DAILY
Qty: 90 TABLET | Refills: 0 | Status: SHIPPED | OUTPATIENT
Start: 2025-03-04

## 2025-03-09 LAB
NCCN CRITERIA FLAG: ABNORMAL
TYRER CUZICK SCORE: 12.6

## 2025-03-14 ENCOUNTER — DOCUMENTATION (OUTPATIENT)
Dept: GENETICS | Facility: HOSPITAL | Age: 50
End: 2025-03-14
Payer: COMMERCIAL

## 2025-03-19 ENCOUNTER — HOSPITAL ENCOUNTER (OUTPATIENT)
Dept: MAMMOGRAPHY | Facility: HOSPITAL | Age: 50
Discharge: HOME | End: 2025-03-19
Admitting: NURSE PRACTITIONER
Payer: COMMERCIAL

## 2025-03-19 DIAGNOSIS — Z12.31 ENCOUNTER FOR SCREENING MAMMOGRAM FOR MALIGNANT NEOPLASM OF BREAST: ICD-10-CM

## 2025-03-19 PROCEDURE — 77067 SCR MAMMO BI INCL CAD: CPT

## 2025-03-19 PROCEDURE — 77063 BREAST TOMOSYNTHESIS BI: CPT

## 2025-04-21 DIAGNOSIS — F41.9 ANXIETY: ICD-10-CM

## 2025-04-21 DIAGNOSIS — I10 PRIMARY HYPERTENSION: Primary | ICD-10-CM

## 2025-04-21 RX ORDER — ESCITALOPRAM OXALATE 10 MG/1
10 TABLET ORAL DAILY
Qty: 90 TABLET | Refills: 0 | Status: SHIPPED | OUTPATIENT
Start: 2025-04-21

## 2025-04-21 RX ORDER — LOSARTAN POTASSIUM 100 MG/1
100 TABLET ORAL DAILY
Qty: 90 TABLET | Refills: 0 | Status: SHIPPED | OUTPATIENT
Start: 2025-04-21

## 2025-06-22 DIAGNOSIS — I10 PRIMARY HYPERTENSION: ICD-10-CM

## 2025-06-23 RX ORDER — LOSARTAN POTASSIUM 100 MG/1
100 TABLET ORAL DAILY
Qty: 90 TABLET | Refills: 0 | Status: SHIPPED | OUTPATIENT
Start: 2025-06-23

## 2025-07-09 DIAGNOSIS — F41.9 ANXIETY: ICD-10-CM

## 2025-07-10 RX ORDER — ESCITALOPRAM OXALATE 10 MG/1
10 TABLET ORAL DAILY
Qty: 90 TABLET | Refills: 1 | Status: SHIPPED | OUTPATIENT
Start: 2025-07-10 | End: 2025-07-12

## 2025-07-12 DIAGNOSIS — F41.9 ANXIETY: Primary | ICD-10-CM

## 2025-07-12 RX ORDER — ESCITALOPRAM OXALATE 20 MG/1
20 TABLET ORAL DAILY
Qty: 90 TABLET | Refills: 0 | Status: SHIPPED | OUTPATIENT
Start: 2025-07-12 | End: 2025-07-14 | Stop reason: SDUPTHER

## 2025-07-14 DIAGNOSIS — F41.9 ANXIETY: ICD-10-CM

## 2025-07-14 RX ORDER — ESCITALOPRAM OXALATE 20 MG/1
20 TABLET ORAL DAILY
Qty: 90 TABLET | Refills: 0 | Status: SHIPPED | OUTPATIENT
Start: 2025-07-14